# Patient Record
Sex: MALE | Race: WHITE | NOT HISPANIC OR LATINO | Employment: OTHER | URBAN - METROPOLITAN AREA
[De-identification: names, ages, dates, MRNs, and addresses within clinical notes are randomized per-mention and may not be internally consistent; named-entity substitution may affect disease eponyms.]

---

## 2022-10-28 ENCOUNTER — HOSPITAL ENCOUNTER (OUTPATIENT)
Dept: PET IMAGING | Facility: CLINIC | Age: 57
Discharge: HOME | End: 2022-10-28
Attending: PHYSICIAN ASSISTANT | Admitting: PHYSICIAN ASSISTANT
Payer: COMMERCIAL

## 2022-10-28 DIAGNOSIS — R91.8 OTHER NONSPECIFIC ABNORMAL FINDING OF LUNG FIELD: ICD-10-CM

## 2022-10-28 PROCEDURE — 343N000001 HC RX 343

## 2022-10-28 PROCEDURE — 78815 PET IMAGE W/CT SKULL-THIGH: CPT | Mod: 26

## 2022-10-28 PROCEDURE — 78815 PET IMAGE W/CT SKULL-THIGH: CPT | Mod: PI

## 2022-10-28 PROCEDURE — A9552 F18 FDG: HCPCS

## 2022-10-28 RX ADMIN — FLUDEOXYGLUCOSE F-18 11.85 MCI.: 500 INJECTION, SOLUTION INTRAVENOUS at 12:46

## 2024-08-20 ENCOUNTER — TRANSFERRED RECORDS (OUTPATIENT)
Dept: HEALTH INFORMATION MANAGEMENT | Facility: CLINIC | Age: 59
End: 2024-08-20

## 2024-09-06 ENCOUNTER — TRANSCRIBE ORDERS (OUTPATIENT)
Dept: OTHER | Age: 59
End: 2024-09-06

## 2024-09-06 DIAGNOSIS — M54.2 CERVICALGIA: Primary | ICD-10-CM

## 2024-09-09 NOTE — TELEPHONE ENCOUNTER
Action 9/9/24 MV 12.22pm   Action Taken [facility] imaging report sent to scanning       SPINE PATIENTS - NEW PROTOCOL PREVISIT    RECORDS RECEIVED FROM: external   REASON FOR VISIT: Spine, Cervicalgia    PROVIDER: Dr. Mancuso   DATE OF APPT: Spine, Cervicalgia    NOTES (FOR ALL VISITS) STATUS DETAILS   OFFICE NOTE from referring provider Received Franko ELAINE @ [facility]:  6/18/24   MEDICATION LIST Received    IMAGING  (FOR ALL VISITS)     MRI (HEAD, NECK, SPINE) PACS [facility]:  MRI Cervical Spine 8/20/24   XRAY (SPINE) *NEUROSURGERY* PACS [facility]:  XR Cervical Spine 6/18/24

## 2024-09-10 ENCOUNTER — TELEPHONE (OUTPATIENT)
Dept: NEUROSURGERY | Facility: CLINIC | Age: 59
End: 2024-09-10

## 2024-09-10 ENCOUNTER — PRE VISIT (OUTPATIENT)
Dept: NEUROSURGERY | Facility: CLINIC | Age: 59
End: 2024-09-10

## 2024-09-10 ENCOUNTER — OFFICE VISIT (OUTPATIENT)
Dept: NEUROSURGERY | Facility: CLINIC | Age: 59
End: 2024-09-10
Payer: COMMERCIAL

## 2024-09-10 VITALS — OXYGEN SATURATION: 88 % | HEART RATE: 112 BPM | SYSTOLIC BLOOD PRESSURE: 113 MMHG | DIASTOLIC BLOOD PRESSURE: 73 MMHG

## 2024-09-10 DIAGNOSIS — G89.29 CHRONIC LOW BACK PAIN, UNSPECIFIED BACK PAIN LATERALITY, UNSPECIFIED WHETHER SCIATICA PRESENT: Primary | ICD-10-CM

## 2024-09-10 DIAGNOSIS — M54.50 CHRONIC LOW BACK PAIN, UNSPECIFIED BACK PAIN LATERALITY, UNSPECIFIED WHETHER SCIATICA PRESENT: Primary | ICD-10-CM

## 2024-09-10 PROCEDURE — 99203 OFFICE O/P NEW LOW 30 MIN: CPT | Mod: GC | Performed by: NEUROLOGICAL SURGERY

## 2024-09-10 ASSESSMENT — PAIN SCALES - GENERAL: PAINLEVEL: EXTREME PAIN (8)

## 2024-09-10 NOTE — LETTER
"9/10/2024       RE: Heber Simon  [address]  [place] [address]     Dear Colleague,    Thank you for referring your patient, Heber Simon, to the [facility] [place] at [facility]. Please see a copy of my visit note below.    9/10/2024  Neurosurgery Clinic Visit    Chief Complaint: Cervicalgia    History of present illness:  Heber Simon is a 59 year old male presenting with a PMHx of tobacco abuse and chronic obstructive pulmonary disease, presenting today on referral from his primary provider for worsening neck pain.   He endorses long standing \"cervical arthritis\" with no previous intervention, however now has worsening of his symptoms. At baseline, rated 7/10, with occasional bursts of radiating pain to the posterior shoulder, anterior shoulder and upper chest, increasing the pain severity to 10/10.    Denies upper extremity radiculopathy, numbness, or gait instability, but endorses ongoing clumsiness in the hands. His pain was previously controlled on naproxen, but patient had to quit taking this due to GI concerns. Physical therapy and chiropraction ongoing, reportedly helps with the pain. Patient has not trialed HUANG's. No previous spine surgeries documented, patient is not currently on anti-platelet or anti-coagulant medication, however, he is an active smoker, and reports that it helps distract him from the pain.     Physical exam:   /73 (BP Location: Right arm, Patient Position: Sitting, Cuff Size: Adult Regular)   Pulse 112   SpO2 (!) 88%   General: Awake and alert and in no acute distress. Generalized tremors noted, uppers > lowers  Pulm: Breathing comfortably on room air  Neurologic:  - Face symmetric  - Pupils reactive, extraocular movements intact  - Full strength in upper and lower extremities  - Intact sensation to all extremities  - No clonus, patellar and bicep reflexes " 2+  - Intact coordination    Imaging:  All previous imaging pertinent to this encounter reviewed.    Assessment:  Heber Simon is a 59 year old male presenting with a PMHx of tobacco abuse and chronic obstructive pulmonary disease, presenting today on referral from his primary provider for worsening neck pain.   Images were extensively reviewed with Dr Mancuso, with no significant central canal or neural foraminal stenosis. The pattern of his pain mostly favors a musculoskeletal source.    Plan:  - No neurosurgical intervention indicated at this time  - PM&R referral for pain management    Patient seen and discussed with Dr. Jamee MD.  Approximately 40 minutes were spent in chart and image review, evaluation of the patient and assessment of next steps.    Lizeth Sandhu MD, PGY-1  Department of Neurosurgery  Pager: [phone]    Please contact neurosurgery resident on call with questions.    Dial * * [phone], enter [phone] when prompted.       REVIEWED ASSOCIATED CLINICAL DATA AND HISTORY FOUND IN THE MEDICAL RECORD:  Past Medical History:   No past medical history on file.    Surgical History:   No past surgical history on file.    Social history:        Family history:   No family history on file.    Medications:  No current outpatient medications on file.     No current facility-administered medications for this visit.       Allergies:   No Known Allergies      Again, thank you for allowing me to participate in the care of your patient.      Sincerely,    Marlen Mancuso MD

## 2024-09-10 NOTE — PATIENT INSTRUCTIONS
Dr Mancuso has recommended non-operative, conservative management for your neck and shoulder discomfort.  To that end she entered a referral to Physical Medicine for continued assessment and intervention.  A member of our scheduling team will reach out to help coordinate the initial appts.

## 2024-09-10 NOTE — PROGRESS NOTES
"9/10/2024  Neurosurgery Clinic Visit    Chief Complaint: Cervicalgia    History of present illness:  Heber Simon is a 59 year old male presenting with a PMHx of tobacco abuse and chronic obstructive pulmonary disease, presenting today on referral from his primary provider for worsening neck pain.   He endorses long standing \"cervical arthritis\" with no previous intervention, however now has worsening of his symptoms. At baseline, rated 7/10, with occasional bursts of radiating pain to the posterior shoulder, anterior shoulder and upper chest, increasing the pain severity to 10/10.    Denies upper extremity radiculopathy, numbness, or gait instability, but endorses ongoing clumsiness in the hands. His pain was previously controlled on naproxen, but patient had to quit taking this due to GI concerns. Physical therapy and chiropraction ongoing, reportedly helps with the pain. Patient has not trialed HUANG's. No previous spine surgeries documented, patient is not currently on anti-platelet or anti-coagulant medication, however, he is an active smoker, and reports that it helps distract him from the pain.     Physical exam:   /73 (BP Location: Right arm, Patient Position: Sitting, Cuff Size: Adult Regular)   Pulse 112   SpO2 (!) 88%   General: Awake and alert and in no acute distress. Generalized tremors noted, uppers > lowers  Pulm: Breathing comfortably on room air  Neurologic:  - Face symmetric  - Pupils reactive, extraocular movements intact  - Full strength in upper and lower extremities  - Intact sensation to all extremities  - No clonus, patellar and bicep reflexes 2+  - Intact coordination    Imaging:  All previous imaging pertinent to this encounter reviewed.    Assessment:  Heber Simon is a 59 year old male presenting with a PMHx of tobacco abuse and chronic obstructive pulmonary disease, presenting today on referral from his primary provider for worsening neck pain.   Images were " extensively reviewed with Dr Mancuso, with no significant central canal or neural foraminal stenosis. The pattern of his pain mostly favors a musculoskeletal source.    Plan:  - No neurosurgical intervention indicated at this time  - PM&R referral for pain management    Patient seen and discussed with Dr. Jamee MD.  Approximately 40 minutes were spent in chart and image review, evaluation of the patient and assessment of next steps.    Lizeth Sandhu MD, PGY-1  Department of Neurosurgery  Pager: [phone]    Please contact neurosurgery resident on call with questions.    Dial * * [phone], enter [phone] when prompted.       REVIEWED ASSOCIATED CLINICAL DATA AND HISTORY FOUND IN THE MEDICAL RECORD:  Past Medical History:   No past medical history on file.    Surgical History:   No past surgical history on file.    Social history:        Family history:   No family history on file.    Medications:  No current outpatient medications on file.     No current facility-administered medications for this visit.       Allergies:   No Known Allergies  I have seen this patient with the resident and formulated a plan and agree with this note.  AMP

## 2024-09-10 NOTE — TELEPHONE ENCOUNTER
Tried calling patient to reschedule appointment from Dr. Mancuso to Cassidy Moore per Yayo Block via task. Phone would ring once or twice and then hang up. Unable to reach. -KB

## 2024-09-19 ENCOUNTER — PATIENT OUTREACH (OUTPATIENT)
Dept: CARE COORDINATION | Facility: CLINIC | Age: 59
End: 2024-09-19

## 2024-09-25 ENCOUNTER — TELEPHONE (OUTPATIENT)
Dept: PHYSICAL MEDICINE AND REHAB | Facility: CLINIC | Age: 59
End: 2024-09-25

## 2024-09-25 ENCOUNTER — OFFICE VISIT (OUTPATIENT)
Dept: PHYSICAL MEDICINE AND REHAB | Facility: CLINIC | Age: 59
End: 2024-09-25
Payer: COMMERCIAL

## 2024-09-25 VITALS — OXYGEN SATURATION: 97 % | SYSTOLIC BLOOD PRESSURE: 107 MMHG | HEART RATE: 111 BPM | DIASTOLIC BLOOD PRESSURE: 68 MMHG

## 2024-09-25 DIAGNOSIS — M75.52 SUBACROMIAL BURSITIS OF LEFT SHOULDER JOINT: ICD-10-CM

## 2024-09-25 DIAGNOSIS — G89.29 CHRONIC LOW BACK PAIN, UNSPECIFIED BACK PAIN LATERALITY, UNSPECIFIED WHETHER SCIATICA PRESENT: ICD-10-CM

## 2024-09-25 DIAGNOSIS — M47.812 CERVICAL SPONDYLOSIS WITHOUT MYELOPATHY: Primary | ICD-10-CM

## 2024-09-25 DIAGNOSIS — G89.29 OTHER CHRONIC PAIN: ICD-10-CM

## 2024-09-25 DIAGNOSIS — M75.42 IMPINGEMENT SYNDROME, SHOULDER, LEFT: ICD-10-CM

## 2024-09-25 DIAGNOSIS — M54.50 CHRONIC LOW BACK PAIN, UNSPECIFIED BACK PAIN LATERALITY, UNSPECIFIED WHETHER SCIATICA PRESENT: ICD-10-CM

## 2024-09-25 PROCEDURE — 99205 OFFICE O/P NEW HI 60 MIN: CPT | Mod: 25 | Performed by: PHYSICAL MEDICINE & REHABILITATION

## 2024-09-25 PROCEDURE — 20610 DRAIN/INJ JOINT/BURSA W/O US: CPT | Mod: LT | Performed by: PHYSICAL MEDICINE & REHABILITATION

## 2024-09-25 RX ORDER — FAMOTIDINE 20 MG/1
20 TABLET, FILM COATED ORAL
COMMUNITY
Start: 2021-11-02

## 2024-09-25 RX ORDER — CLONAZEPAM 0.5 MG/1
0.5 TABLET ORAL
COMMUNITY

## 2024-09-25 RX ORDER — BUPIVACAINE HYDROCHLORIDE 5 MG/ML
4 INJECTION, SOLUTION EPIDURAL; INTRACAUDAL ONCE
Status: COMPLETED | OUTPATIENT
Start: 2024-09-25 | End: 2024-09-25

## 2024-09-25 RX ORDER — ALBUTEROL SULFATE 90 UG/1
AEROSOL, METERED RESPIRATORY (INHALATION)
COMMUNITY
Start: 2021-12-14

## 2024-09-25 RX ORDER — CLOZAPINE 100 MG/1
200 TABLET ORAL
COMMUNITY

## 2024-09-25 RX ORDER — ACETAMINOPHEN 500 MG
1000 TABLET ORAL
COMMUNITY

## 2024-09-25 RX ORDER — DIVALPROEX SODIUM 250 MG/1
250 TABLET, EXTENDED RELEASE ORAL
COMMUNITY
Start: 2022-02-10

## 2024-09-25 RX ORDER — BUDESONIDE AND FORMOTEROL FUMARATE DIHYDRATE 160; 4.5 UG/1; UG/1
2 AEROSOL RESPIRATORY (INHALATION)
COMMUNITY

## 2024-09-25 RX ORDER — VENLAFAXINE HYDROCHLORIDE 75 MG/1
225 CAPSULE, EXTENDED RELEASE ORAL
COMMUNITY
Start: 2022-06-10

## 2024-09-25 RX ORDER — BETAMETHASONE SODIUM PHOSPHATE AND BETAMETHASONE ACETATE 3; 3 MG/ML; MG/ML
6 INJECTION, SUSPENSION INTRA-ARTICULAR; INTRALESIONAL; INTRAMUSCULAR; SOFT TISSUE ONCE
Status: COMPLETED | OUTPATIENT
Start: 2024-09-25 | End: 2024-09-25

## 2024-09-25 RX ADMIN — BETAMETHASONE SODIUM PHOSPHATE AND BETAMETHASONE ACETATE 6 MG: 3; 3 INJECTION, SUSPENSION INTRA-ARTICULAR; INTRALESIONAL; INTRAMUSCULAR; SOFT TISSUE at 13:39

## 2024-09-25 RX ADMIN — BUPIVACAINE HYDROCHLORIDE 20 MG: 5 INJECTION, SOLUTION EPIDURAL; INTRACAUDAL at 13:40

## 2024-09-25 NOTE — TELEPHONE ENCOUNTER
Called patient to schedule procedure with Dr. Alberts    Date of Procedure: 12/2/24 & 12/16/24    Arrival time given: Yes: Arrival Time 12pm       Procedure Location: [facility]     Verified Location with Patient:  Yes  Address provided to the patient     Pre-op H&P Required:  No: Local anesthesia        Post-Op/Follow Up Appt:  Not Indicated in Request      Informed patient they will need a  to drive them home:  Yes    Patients : Spouse     Patient is aware that pre-op RN from the procedure center will call 2-3 days prior to scheduled procedure to confirm arrival time and review any instructions:  Yes       Additional Comments: N/A        Sruthi Dominguez MA on 9/25/2024 at 1:59 PM      P: [phone]*   
DISPLAY PLAN FREE TEXT

## 2024-09-25 NOTE — NURSING NOTE
Heber Simon is a 59 year old male who presents for:  Chief Complaint   Patient presents with    Consult     Neck and shoulder pain       Initial Vitals: /68 (BP Location: Right arm, Patient Position: Supine, Cuff Size: Adult Regular)   Pulse 111   SpO2 97%   BP cuff size: regular      Leda Pelayo MA

## 2024-09-25 NOTE — PROGRESS NOTES
CC: I am seeing this patient at the request of Dr. Mancuso for evaluation of chronic left neck shoulder and chest pain.    Patient is a very pleasant 59-year-old disabled  with a history of COPD.  He has been dealing with chronic neck pain for many years but has been worse since March of this year.  He rates his pain at its worst at 10 at best a 7 and currently and 9.  Pain would radiate into the chest from the back and notes that it feels searing.  It has also affected his left hand functioning with clumsiness.  He has tried naproxen but had problems with GI and had to stop.  He has completed physical therapy and chiropractic without help.  He has no history of prior surgeries.  He had initially quit smoking but has resumed due to the pain.  He was recently seen by the neurosurgeon.  He had undergone MRI of the cervical spine on 8/2/2024.  This was at [facility].  No neurosurgical intervention was felt indicated.  Dr. Mancuso did not see significant central canal or neuroforaminal stenosis.    PMH:  1. Acute hypoxic respiratory failure secondary to acute COPD exacerbation  2. Anxiety disorder  3. Hyperlipidemia  4. Chronic constipation  5. Hyponatremia   6. schizoaffective disorder  7. Bipolar type 1     No past surgical history on file.    There is family history of back pain in his brother.    Social History     Socioeconomic History    Marital status: Single     Spouse name: Not on file    Number of children: Not on file    Years of education: Not on file    Highest education level: Not on file   Occupational History    Not on file   Tobacco Use    Smoking status: Every Day     Types: Cigarettes    Smokeless tobacco: Never   Vaping Use    Vaping status: Never Used   Substance and Sexual Activity    Alcohol use: Not on file    Drug use: Not on file    Sexual activity: Not on file   Other Topics Concern    Not on file   Social History Narrative    Not on file     Social Determinants of Health     Financial Resource  Strain: Not on file   Food Insecurity: Not on file   Transportation Needs: Not on file   Physical Activity: Not on file   Stress: Not on file   Social Connections: Not on file   Interpersonal Safety: Not on file   Housing Stability: Not on file      Current Outpatient Medications   Medication Sig Dispense Refill    albuterol (PROAIR HFA/PROVENTIL HFA/VENTOLIN HFA) 108 (90 Base) MCG/ACT inhaler INHALE 2 PUFFS BY MOUTH EVERY 4 HOURS AS NEEDED FOR BREATHING , WAIT AT LEAST 1-5 MINUTES BETWEEN EACH PUFF      divalproex sodium extended-release (DEPAKOTE ER) 250 MG 24 hr tablet 250 mg.      famotidine (PEPCID) 20 MG tablet 20 mg.      venlafaxine (EFFEXOR XR) 75 MG 24 hr capsule 225 mg.      acetaminophen (TYLENOL) 500 MG tablet Take 1,000 mg by mouth.      budesonide-formoterol (SYMBICORT) 160-4.5 MCG/ACT Inhaler Inhale 2 Inhalations into the lungs.      clonazePAM (KLONOPIN) 0.5 MG tablet Take 0.5 mg by mouth.      cloZAPine (CLOZARIL) 100 MG tablet Take 200 mg by mouth.       /68 (BP Location: Right arm, Patient Position: Supine, Cuff Size: Adult Regular)   Pulse 111   SpO2 97%      On examination, patient is alert and cooperative.  He has COPD.    Tremor noted uppers greater than lowers.    HEENT is otherwise unremarkable.  Extraocular movements are intact.  Face is symmetric.  Tongue is midline.  Neck is supple.  He is able to move his upper extremities.  He has discomfort with the left shoulder region.  He is tender over the left sternal clavicular joint as well as subacromial bursal region on the left.  There is impingement of the shoulder with tenderness over the coracoid process.  There is tenderness of the cervical facets especially left C4-5 and C5-6.  The right side was not tender.    He has protuberant abdomen.  No tenderness noted over the lumbar spine.  SI was nontender.    He is able to stand.  Romberg is negative.  Gait is unremarkable.  Neurological examination revealed normal strength, normal  sensation.  Reflexes were symmetric and plantars were downgoing.  Coordination tests are intact.    Impression: At this point this 59-year-old gentleman with cervical facet arthropathy at C4-5 and C5-6 resulting in left-sided neck and shoulder pain.  There is also some chest wall pain associated.  He also has involvement of the left shoulder with impingement including subacromial bursal and over the coracoid process.  There could be a sternoclavicular joint involvement as well.    In terms of treatment, he has tried conservative measures including medications and therapies without success.  He has had imaging studies.  I would recommend injecting the left subacromial bursa, origin of pectoralis minor and coracobrachialis which are noted to be tender and see how he responds.  He will require medial branch blocks at C4-C6 on the left side, diagnostic followed by confirmatory 2 weeks apart.  If he gets good relief from them, he would be candidate for radiofrequency ablations.  He is interested in quitting smoking and managing his pain will help with that.    This was reviewed at length with the patient.  Patient would like to proceed with the treatment.  60 minutes visit, greater than 50% was for counseling and coordination of above-mentioned issues, exclusive of the procedure time.    PROCEDURE NOTE: With his informed consent, after explaining the benefits and risks of the procedure, using ChloraPrep for skin prep, 1 cc of Celestone with 4 cc of 0.5% bupivacaine, 4 cc were injected into the left subacromial region.  Next half a cc was injected into the left pectoralis minor origin and half a cc to left coracobrachialis origin.  Post injections, he was able to feel comfortable and move his shoulder better with improvement.    Will seek authorization for his facet blocks and eventual radiofrequency ablations.  Thank you are much for allow me to assist in his care.  All questions were answered to his and his  sister-in-law's satisfaction.    Flavio Alberts MD

## 2024-09-25 NOTE — LETTER
9/25/2024       RE: Heber Simon  [address]  [place] [address]     Dear Colleague,    Thank you for referring your patient, Heber Simon, to the [facility] at [facility]. Please see a copy of my visit note below.    CC: I am seeing this patient at the request of Dr. Mancuso for evaluation of chronic left neck shoulder and chest pain.    Patient is a very pleasant 59-year-old disabled  with a history of COPD.  He has been dealing with chronic neck pain for many years but has been worse since March of this year.  He rates his pain at its worst at 10 at best a 7 and currently and 9.  Pain would radiate into the chest from the back and notes that it feels searing.  It has also affected his left hand functioning with clumsiness.  He has tried naproxen but had problems with GI and had to stop.  He has completed physical therapy and chiropractic without help.  He has no history of prior surgeries.  He had initially quit smoking but has resumed due to the pain.  He was recently seen by the neurosurgeon.  He had undergone MRI of the cervical spine on 8/2/2024.  This was at [facility].  No neurosurgical intervention was felt indicated.  Dr. Mancuso did not see significant central canal or neuroforaminal stenosis.    PMH:  1. Acute hypoxic respiratory failure secondary to acute COPD exacerbation  2. Anxiety disorder  3. Hyperlipidemia  4. Chronic constipation  5. Hyponatremia   6. schizoaffective disorder  7. Bipolar type 1     No past surgical history on file.    There is family history of back pain in his brother.    Social History     Socioeconomic History     Marital status: Single     Spouse name: Not on file     Number of children: Not on file     Years of education: Not on file     Highest education level: Not on file   Occupational History     Not on file   Tobacco Use     Smoking status: Every Day     Types: Cigarettes      Smokeless tobacco: Never   Vaping Use     Vaping status: Never Used   Substance and Sexual Activity     Alcohol use: Not on file     Drug use: Not on file     Sexual activity: Not on file   Other Topics Concern     Not on file   Social History Narrative     Not on file     Social Determinants of Health     Financial Resource Strain: Not on file   Food Insecurity: Not on file   Transportation Needs: Not on file   Physical Activity: Not on file   Stress: Not on file   Social Connections: Not on file   Interpersonal Safety: Not on file   Housing Stability: Not on file      Current Outpatient Medications   Medication Sig Dispense Refill     albuterol (PROAIR HFA/PROVENTIL HFA/VENTOLIN HFA) 108 (90 Base) MCG/ACT inhaler INHALE 2 PUFFS BY MOUTH EVERY 4 HOURS AS NEEDED FOR BREATHING , WAIT AT LEAST 1-5 MINUTES BETWEEN EACH PUFF       divalproex sodium extended-release (DEPAKOTE ER) 250 MG 24 hr tablet 250 mg.       famotidine (PEPCID) 20 MG tablet 20 mg.       venlafaxine (EFFEXOR XR) 75 MG 24 hr capsule 225 mg.       acetaminophen (TYLENOL) 500 MG tablet Take 1,000 mg by mouth.       budesonide-formoterol (SYMBICORT) 160-4.5 MCG/ACT Inhaler Inhale 2 Inhalations into the lungs.       clonazePAM (KLONOPIN) 0.5 MG tablet Take 0.5 mg by mouth.       cloZAPine (CLOZARIL) 100 MG tablet Take 200 mg by mouth.       /68 (BP Location: Right arm, Patient Position: Supine, Cuff Size: Adult Regular)   Pulse 111   SpO2 97%      On examination, patient is alert and cooperative.  He has COPD.    Tremor noted uppers greater than lowers.    HEENT is otherwise unremarkable.  Extraocular movements are intact.  Face is symmetric.  Tongue is midline.  Neck is supple.  He is able to move his upper extremities.  He has discomfort with the left shoulder region.  He is tender over the left sternal clavicular joint as well as subacromial bursal region on the left.  There is impingement of the shoulder with tenderness over the coracoid  process.  There is tenderness of the cervical facets especially left C4-5 and C5-6.  The right side was not tender.    He has protuberant abdomen.  No tenderness noted over the lumbar spine.  SI was nontender.    He is able to stand.  Romberg is negative.  Gait is unremarkable.  Neurological examination revealed normal strength, normal sensation.  Reflexes were symmetric and plantars were downgoing.  Coordination tests are intact.    Impression: At this point this 59-year-old gentleman with cervical facet arthropathy at C4-5 and C5-6 resulting in left-sided neck and shoulder pain.  There is also some chest wall pain associated.  He also has involvement of the left shoulder with impingement including subacromial bursal and over the coracoid process.  There could be a sternoclavicular joint involvement as well.    In terms of treatment, he has tried conservative measures including medications and therapies without success.  He has had imaging studies.  I would recommend injecting the left subacromial bursa, origin of pectoralis minor and coracobrachialis which are noted to be tender and see how he responds.  He will require medial branch blocks at C4-C6 on the left side, diagnostic followed by confirmatory 2 weeks apart.  If he gets good relief from them, he would be candidate for radiofrequency ablations.  He is interested in quitting smoking and managing his pain will help with that.    This was reviewed at length with the patient.  Patient would like to proceed with the treatment.  60 minutes visit, greater than 50% was for counseling and coordination of above-mentioned issues, exclusive of the procedure time.    PROCEDURE NOTE: With his informed consent, after explaining the benefits and risks of the procedure, using ChloraPrep for skin prep, 1 cc of Celestone with 4 cc of 0.5% bupivacaine, 4 cc were injected into the left subacromial region.  Next half a cc was injected into the left pectoralis minor origin and  half a cc to left coracobrachialis origin.  Post injections, he was able to feel comfortable and move his shoulder better with improvement.    Will seek authorization for his facet blocks and eventual radiofrequency ablations.  Thank you are much for allow me to assist in his care.  All questions were answered to his and his sister-in-law's satisfaction.    Flavio Alberts MD       Again, thank you for allowing me to participate in the care of your patient.      Sincerely,    Flavio Alberts MD

## 2024-10-06 ENCOUNTER — HEALTH MAINTENANCE LETTER (OUTPATIENT)
Age: 59
End: 2024-10-06

## 2024-10-09 ENCOUNTER — OFFICE VISIT (OUTPATIENT)
Dept: PHYSICAL MEDICINE AND REHAB | Facility: CLINIC | Age: 59
End: 2024-10-09
Payer: COMMERCIAL

## 2024-10-09 VITALS — OXYGEN SATURATION: 93 % | SYSTOLIC BLOOD PRESSURE: 128 MMHG | DIASTOLIC BLOOD PRESSURE: 85 MMHG | HEART RATE: 100 BPM

## 2024-10-09 DIAGNOSIS — G24.3 CERVICAL DYSTONIA: ICD-10-CM

## 2024-10-09 DIAGNOSIS — M47.812 CERVICAL SPONDYLOSIS WITHOUT MYELOPATHY: ICD-10-CM

## 2024-10-09 DIAGNOSIS — G24.8 FOCAL DYSTONIA: Primary | ICD-10-CM

## 2024-10-09 DIAGNOSIS — G89.29 OTHER CHRONIC PAIN: ICD-10-CM

## 2024-10-09 PROCEDURE — 99214 OFFICE O/P EST MOD 30 MIN: CPT | Mod: 25 | Performed by: PHYSICAL MEDICINE & REHABILITATION

## 2024-10-09 PROCEDURE — 20553 NJX 1/MLT TRIGGER POINTS 3/>: CPT | Performed by: PHYSICAL MEDICINE & REHABILITATION

## 2024-10-09 RX ORDER — BETAMETHASONE SODIUM PHOSPHATE AND BETAMETHASONE ACETATE 3; 3 MG/ML; MG/ML
6 INJECTION, SUSPENSION INTRA-ARTICULAR; INTRALESIONAL; INTRAMUSCULAR; SOFT TISSUE ONCE
Status: COMPLETED | OUTPATIENT
Start: 2024-10-09 | End: 2024-10-09

## 2024-10-09 RX ORDER — BUPIVACAINE HYDROCHLORIDE 5 MG/ML
5 INJECTION, SOLUTION EPIDURAL; INTRACAUDAL ONCE
Status: COMPLETED | OUTPATIENT
Start: 2024-10-09 | End: 2024-10-09

## 2024-10-09 RX ADMIN — BUPIVACAINE HYDROCHLORIDE 25 MG: 5 INJECTION, SOLUTION EPIDURAL; INTRACAUDAL at 12:07

## 2024-10-09 RX ADMIN — BETAMETHASONE SODIUM PHOSPHATE AND BETAMETHASONE ACETATE 6 MG: 3; 3 INJECTION, SUSPENSION INTRA-ARTICULAR; INTRALESIONAL; INTRAMUSCULAR; SOFT TISSUE at 12:06

## 2024-10-09 ASSESSMENT — PAIN SCALES - GENERAL: PAINLEVEL: EXTREME PAIN (8)

## 2024-10-09 NOTE — LETTER
10/9/2024       RE: Heber Simon  [address]  [place] [address]     Dear Colleague,    Thank you for referring your patient, Heber Simon, to the [employer] [facility] at [facility]. Please see a copy of my visit note below.    CC: Patient with chronic left neck shoulder and chest pain.     He was last seen by me on 9/25/2024.  His interval history is notable for getting 2 days worth of good relief with the pain.  However he ended up getting massage therapy and his pain shot up to 8-9 out of 10.    He also has a traction unit that he has used in the past.  I had cautioned him from using it due to the significant narrowing and impingement that he has in the cervical spine.    Patient is a very pleasant 59-year-old disabled  with a history of COPD.  He has been dealing with chronic neck pain for many years but has been worse since March of this year.  He rates his pain at its worst at 10 at best a 7 and currently and 9.  Pain would radiate into the chest from the back and notes that it feels searing.  It has also affected his left hand functioning with clumsiness.  He has tried naproxen but had problems with GI and had to stop.  He has completed physical therapy and chiropractic without help.  He has no history of prior surgeries.  He had initially quit smoking but has resumed due to the pain.  He was recently seen by the neurosurgeon.  He had undergone MRI of the cervical spine on 8/2/2024.  This was at [facility].  No neurosurgical intervention was felt indicated.  Dr. Mancuso did not see significant central canal or neuroforaminal stenosis.     PMH:  1. Acute hypoxic respiratory failure secondary to acute COPD exacerbation  2. Anxiety disorder  3. Hyperlipidemia  4. Chronic constipation  5. Hyponatremia   6. schizoaffective disorder  7. Bipolar type 1      Past Surgical History   No past surgical history on file.        There  is family history of back pain in his brother.     Social History   Social History            Socioeconomic History     Marital status: Single       Spouse name: Not on file     Number of children: Not on file     Years of education: Not on file     Highest education level: Not on file   Occupational History     Not on file   Tobacco Use     Smoking status: Every Day       Types: Cigarettes     Smokeless tobacco: Never   Vaping Use     Vaping status: Never Used   Substance and Sexual Activity     Alcohol use: Not on file     Drug use: Not on file     Sexual activity: Not on file   Other Topics Concern     Not on file   Social History Narrative     Not on file      Social Determinants of Health      Financial Resource Strain: Not on file   Food Insecurity: Not on file   Transportation Needs: Not on file   Physical Activity: Not on file   Stress: Not on file   Social Connections: Not on file   Interpersonal Safety: Not on file   Housing Stability: Not on file         Current Outpatient Medications   Medication Sig Dispense Refill     acetaminophen (TYLENOL) 500 MG tablet Take 1,000 mg by mouth.       albuterol (PROAIR HFA/PROVENTIL HFA/VENTOLIN HFA) 108 (90 Base) MCG/ACT inhaler INHALE 2 PUFFS BY MOUTH EVERY 4 HOURS AS NEEDED FOR BREATHING , WAIT AT LEAST 1-5 MINUTES BETWEEN EACH PUFF       budesonide-formoterol (SYMBICORT) 160-4.5 MCG/ACT Inhaler Inhale 2 Inhalations into the lungs.       clonazePAM (KLONOPIN) 0.5 MG tablet Take 0.5 mg by mouth.       cloZAPine (CLOZARIL) 100 MG tablet Take 200 mg by mouth.       divalproex sodium extended-release (DEPAKOTE ER) 250 MG 24 hr tablet 250 mg.       famotidine (PEPCID) 20 MG tablet 20 mg.       venlafaxine (EFFEXOR XR) 75 MG 24 hr capsule 225 mg.         /85 (BP Location: Left arm, Patient Position: Supine, Cuff Size: Adult Regular)   Pulse 100   SpO2 93%     On examination, patient is alert and cooperative.  He has COPD.     Tremor noted uppers greater than  lowers.     HEENT is otherwise unremarkable.  Extraocular movements are intact.  Face is symmetric.  Tongue is midline.  Neck is supple.  He is able to move his upper extremities.  He has discomfort with the left shoulder region.  There is not much tenderness in the subacromial space, coracoid process or the AC joint.  There is tenderness of the cervical facets especially left C4-5 and C5-6.  The right side was not tender.  There is tenderness over the left trapezius, levator scapulae and the neck.  There is involvement of the rhomboids minor rhomboids major infraspinatus, and pectoralis major in the chest.     He has protuberant abdomen.  No tenderness noted over the lumbar spine.  SI was nontender.     He is able to stand.  Romberg is negative.  Gait is unremarkable.  Neurological examination revealed normal strength, normal sensation.  Reflexes were symmetric and plantars were downgoing.  Coordination tests are intact.     Impression: At this point this 59-year-old gentleman with cervical facet arthropathy at C4-5 and C5-6 resulting in left-sided neck and shoulder pain.  There is also some chest wall pain associated.  He has a number of muscles affected in the neck and shoulder region and chest wall region in a focal dystonia pattern.  I will repeat trigger point injections.  I recommended to be careful with massages and tractions and avoid them when possible.  I also encouraged him to use some ice on a as needed basis.    I will also order neurotoxin therapy to treat the affected muscles as that will give him longer periods of relief while avoiding lidocaine Marcaine and steroids.  He is interested in this.  Will treat his cervical facets once the schedule permits.     This was reviewed at length with the patient.  Patient would like to proceed with the treatment.  30 minutes visit, greater than 50% was for counseling and coordination of above-mentioned issues, exclusive of the procedure time.     PROCEDURE  NOTE: With his informed consent, after explaining the benefits and risks of the procedure, using ChloraPrep for skin prep, 1 cc of Celestone with 4 cc of 0.5% bupivacaine, triggers in the left levator scapulae in the neck, trapezius, rhomboids minor, rhomboids major, infraspinatus were injected with relief.  Triggers in the left pectoralis major were injected with relief.  Post injection,  he was able to feel comfortable and move his shoulder better with improvement.     All questions were answered to his and his sister-in-law's satisfaction.     Flavio Alberts MD       Again, thank you for allowing me to participate in the care of your patient.      Sincerely,    Flavio Alberts MD

## 2024-10-09 NOTE — PROGRESS NOTES
CC: Patient with chronic left neck shoulder and chest pain.     He was last seen by me on 9/25/2024.  His interval history is notable for getting 2 days worth of good relief with the pain.  However he ended up getting massage therapy and his pain shot up to 8-9 out of 10.    He also has a traction unit that he has used in the past.  I had cautioned him from using it due to the significant narrowing and impingement that he has in the cervical spine.    Patient is a very pleasant 59-year-old disabled  with a history of COPD.  He has been dealing with chronic neck pain for many years but has been worse since March of this year.  He rates his pain at its worst at 10 at best a 7 and currently and 9.  Pain would radiate into the chest from the back and notes that it feels searing.  It has also affected his left hand functioning with clumsiness.  He has tried naproxen but had problems with GI and had to stop.  He has completed physical therapy and chiropractic without help.  He has no history of prior surgeries.  He had initially quit smoking but has resumed due to the pain.  He was recently seen by the neurosurgeon.  He had undergone MRI of the cervical spine on 8/2/2024.  This was at [facility].  No neurosurgical intervention was felt indicated.  Dr. Mancuso did not see significant central canal or neuroforaminal stenosis.     PMH:  1. Acute hypoxic respiratory failure secondary to acute COPD exacerbation  2. Anxiety disorder  3. Hyperlipidemia  4. Chronic constipation  5. Hyponatremia   6. schizoaffective disorder  7. Bipolar type 1      Past Surgical History   No past surgical history on file.        There is family history of back pain in his brother.     Social History   Social History            Socioeconomic History    Marital status: Single       Spouse name: Not on file    Number of children: Not on file    Years of education: Not on file    Highest education level: Not on file   Occupational History    Not on  file   Tobacco Use    Smoking status: Every Day       Types: Cigarettes    Smokeless tobacco: Never   Vaping Use    Vaping status: Never Used   Substance and Sexual Activity    Alcohol use: Not on file    Drug use: Not on file    Sexual activity: Not on file   Other Topics Concern    Not on file   Social History Narrative    Not on file      Social Determinants of Health      Financial Resource Strain: Not on file   Food Insecurity: Not on file   Transportation Needs: Not on file   Physical Activity: Not on file   Stress: Not on file   Social Connections: Not on file   Interpersonal Safety: Not on file   Housing Stability: Not on file         Current Outpatient Medications   Medication Sig Dispense Refill    acetaminophen (TYLENOL) 500 MG tablet Take 1,000 mg by mouth.      albuterol (PROAIR HFA/PROVENTIL HFA/VENTOLIN HFA) 108 (90 Base) MCG/ACT inhaler INHALE 2 PUFFS BY MOUTH EVERY 4 HOURS AS NEEDED FOR BREATHING , WAIT AT LEAST 1-5 MINUTES BETWEEN EACH PUFF      budesonide-formoterol (SYMBICORT) 160-4.5 MCG/ACT Inhaler Inhale 2 Inhalations into the lungs.      clonazePAM (KLONOPIN) 0.5 MG tablet Take 0.5 mg by mouth.      cloZAPine (CLOZARIL) 100 MG tablet Take 200 mg by mouth.      divalproex sodium extended-release (DEPAKOTE ER) 250 MG 24 hr tablet 250 mg.      famotidine (PEPCID) 20 MG tablet 20 mg.      venlafaxine (EFFEXOR XR) 75 MG 24 hr capsule 225 mg.         /85 (BP Location: Left arm, Patient Position: Supine, Cuff Size: Adult Regular)   Pulse 100   SpO2 93%     On examination, patient is alert and cooperative.  He has COPD.     Tremor noted uppers greater than lowers.     HEENT is otherwise unremarkable.  Extraocular movements are intact.  Face is symmetric.  Tongue is midline.  Neck is supple.  He is able to move his upper extremities.  He has discomfort with the left shoulder region.  There is not much tenderness in the subacromial space, coracoid process or the AC joint.  There is tenderness  of the cervical facets especially left C4-5 and C5-6.  The right side was not tender.  There is tenderness over the left trapezius, levator scapulae and the neck.  There is involvement of the rhomboids minor rhomboids major infraspinatus, and pectoralis major in the chest.     He has protuberant abdomen.  No tenderness noted over the lumbar spine.  SI was nontender.     He is able to stand.  Romberg is negative.  Gait is unremarkable.  Neurological examination revealed normal strength, normal sensation.  Reflexes were symmetric and plantars were downgoing.  Coordination tests are intact.     Impression: At this point this 59-year-old gentleman with cervical facet arthropathy at C4-5 and C5-6 resulting in left-sided neck and shoulder pain.  There is also some chest wall pain associated.  He has a number of muscles affected in the neck and shoulder region and chest wall region in a focal dystonia pattern.  I will repeat trigger point injections.  I recommended to be careful with massages and tractions and avoid them when possible.  I also encouraged him to use some ice on a as needed basis.    I will also order neurotoxin therapy to treat the affected muscles as that will give him longer periods of relief while avoiding lidocaine Marcaine and steroids.  He is interested in this.  Will treat his cervical facets once the schedule permits.     This was reviewed at length with the patient.  Patient would like to proceed with the treatment.  30 minutes visit, greater than 50% was for counseling and coordination of above-mentioned issues, exclusive of the procedure time.     PROCEDURE NOTE: With his informed consent, after explaining the benefits and risks of the procedure, using ChloraPrep for skin prep, 1 cc of Celestone with 4 cc of 0.5% bupivacaine, triggers in the left levator scapulae in the neck, trapezius, rhomboids minor, rhomboids major, infraspinatus were injected with relief.  Triggers in the left pectoralis  major were injected with relief.  Post injection,  he was able to feel comfortable and move his shoulder better with improvement.     All questions were answered to his and his sister-in-law's satisfaction.     Flavio Alberts MD

## 2024-10-09 NOTE — NURSING NOTE
Heber Simon is a 59 year old male who presents for:  Chief Complaint   Patient presents with    Follow Up     2 week follow up TPI, had relief for about 2 days then symptoms returned.  Had massage after injections wondering if that made things worse       Initial Vitals: /85 (BP Location: Left arm, Patient Position: Supine, Cuff Size: Adult Regular)   Pulse 100   SpO2 93%   BP cuff size: regular      Leda Pelayo MA

## 2024-10-22 ENCOUNTER — OFFICE VISIT (OUTPATIENT)
Dept: PHYSICAL MEDICINE AND REHAB | Facility: CLINIC | Age: 59
End: 2024-10-22
Payer: COMMERCIAL

## 2024-10-22 VITALS — OXYGEN SATURATION: 97 % | HEART RATE: 99 BPM | DIASTOLIC BLOOD PRESSURE: 70 MMHG | SYSTOLIC BLOOD PRESSURE: 104 MMHG

## 2024-10-22 DIAGNOSIS — G89.29 OTHER CHRONIC PAIN: ICD-10-CM

## 2024-10-22 DIAGNOSIS — M47.812 CERVICAL SPONDYLOSIS WITHOUT MYELOPATHY: ICD-10-CM

## 2024-10-22 DIAGNOSIS — G24.8 FOCAL DYSTONIA: Primary | ICD-10-CM

## 2024-10-22 PROCEDURE — 99214 OFFICE O/P EST MOD 30 MIN: CPT | Performed by: PHYSICAL MEDICINE & REHABILITATION

## 2024-10-22 NOTE — PROGRESS NOTES
CC: Patient with chronic left neck shoulder and chest pain.     He was last seen by me on 10/9/2024.  He received trigger point injections to multiple muscles in the neck and shoulder and chest wall on the left side.  He felt it was helpful for about 8 days and then got worse.    He is awaiting approval of neurotoxin.  He is scheduled for the medial branch blocks of the neck on the left side in December.    He was seen by me on 9/25/2024.  His interval history is notable for getting 2 days worth of good relief with the pain.  However he ended up getting massage therapy and his pain shot up to 8-9 out of 10.     He also has a traction unit that he has used in the past.  I had cautioned him from using it due to the significant narrowing and impingement that he has in the cervical spine.     Patient is a very pleasant 59-year-old disabled  with a history of COPD.  He has been dealing with chronic neck pain for many years but has been worse since March of this year.  He rates his pain at its worst at 10 at best a 7 and currently and 9.  Pain would radiate into the chest from the back and notes that it feels searing.  It has also affected his left hand functioning with clumsiness.  He has tried naproxen but had problems with GI and had to stop.  He has completed physical therapy and chiropractic without help.  He has no history of prior surgeries.  He had initially quit smoking but has resumed due to the pain.  He was recently seen by the neurosurgeon.  He had undergone MRI of the cervical spine on 8/2/2024.  This was at [facility].  This did show moderate C3-4 spinal stenosis with retrolisthesis and 3 to 4 mm paracentral disc protrusion impinging the cord.  Mild C5-6 and C6-7 spinal stenosis and severe bilateral C6-7 and left more so than right C5-6 neuroforaminal stenosis.    No neurosurgical intervention was felt indicated.  Dr. Mancuso did not see significant central canal or neuroforaminal stenosis.     PMH:  1.  Acute hypoxic respiratory failure secondary to acute COPD exacerbation  2. Anxiety disorder  3. Hyperlipidemia  4. Chronic constipation  5. Hyponatremia   6. schizoaffective disorder  7. Bipolar type 1      Past Surgical History   No past surgical history on file.         There is family history of back pain in his brother.     Social History   Social History                Socioeconomic History    Marital status: Single       Spouse name: Not on file    Number of children: Not on file    Years of education: Not on file    Highest education level: Not on file   Occupational History    Not on file   Tobacco Use    Smoking status: Every Day       Types: Cigarettes    Smokeless tobacco: Never   Vaping Use    Vaping status: Never Used   Substance and Sexual Activity    Alcohol use: Not on file    Drug use: Not on file    Sexual activity: Not on file   Other Topics Concern    Not on file   Social History Narrative    Not on file      Social Determinants of Health      Financial Resource Strain: Not on file   Food Insecurity: Not on file   Transportation Needs: Not on file   Physical Activity: Not on file   Stress: Not on file   Social Connections: Not on file   Interpersonal Safety: Not on file   Housing Stability: Not on file         Current Outpatient Medications   Medication Sig Dispense Refill    acetaminophen (TYLENOL) 500 MG tablet Take 1,000 mg by mouth.      albuterol (PROAIR HFA/PROVENTIL HFA/VENTOLIN HFA) 108 (90 Base) MCG/ACT inhaler INHALE 2 PUFFS BY MOUTH EVERY 4 HOURS AS NEEDED FOR BREATHING , WAIT AT LEAST 1-5 MINUTES BETWEEN EACH PUFF      budesonide-formoterol (SYMBICORT) 160-4.5 MCG/ACT Inhaler Inhale 2 Inhalations into the lungs.      clonazePAM (KLONOPIN) 0.5 MG tablet Take 0.5 mg by mouth.      cloZAPine (CLOZARIL) 100 MG tablet Take 200 mg by mouth.      divalproex sodium extended-release (DEPAKOTE ER) 250 MG 24 hr tablet 250 mg.      famotidine (PEPCID) 20 MG tablet 20 mg.      venlafaxine (EFFEXOR  XR) 75 MG 24 hr capsule 225 mg.            /70 (BP Location: Left arm, Patient Position: Supine, Cuff Size: Adult Regular)   Pulse 99   SpO2 97%       On examination, patient is alert and cooperative.  He has COPD.     Tremor noted uppers greater than lowers.     HEENT is otherwise unremarkable.  Extraocular movements are intact.  Face is symmetric.  Tongue is midline.  Neck is supple.  He is able to move his upper extremities.  He has discomfort with the left shoulder region.  There is not much tenderness in the subacromial space, coracoid process or the AC joint.  There is tenderness of the cervical facets especially left C4-5 and C5-6.  The right side was not tender.  There is tenderness over the left trapezius, levator scapulae and the neck.  There is involvement of the rhomboids minor rhomboids major infraspinatus, and pectoralis major in the chest.     He has protuberant abdomen.  No tenderness noted over the lumbar spine.  SI was nontender.     He is able to stand.  Romberg is negative.  Gait is unremarkable.  Neurological examination revealed normal strength, normal sensation.  Reflexes were symmetric and plantars were downgoing.  Coordination tests are intact.     Impression: At this point this 59-year-old gentleman with cervical facet arthropathy at C4-5 and C5-6 resulting in left-sided neck and shoulder pain.  There is also some chest wall pain associated.  He has a number of muscles affected in the neck and shoulder region and chest wall region in a focal dystonia pattern.  He has received and benefited from trigger point injections.  I recommended to be careful with massages and tractions and avoid them when possible.  I also encouraged him to use some ice on a as needed basis.    I encouraged him to take 1000 mg of Tylenol every 8 hours.  He is on Pepcid and has already tried naproxen without relief.     I will wait for neurotoxin therapy to treat the affected muscles as that will give him  longer periods of relief while avoiding lidocaine Marcaine and steroids.  He is interested in this.  Will treat his cervical facets as scheduled.     This was reviewed at length with the patient.  Patient would like to proceed with the treatment.  30 minutes visit, greater than 50% was for counseling and coordination of above-mentioned issues..     All questions were answered to his and his sister-in-law's satisfaction.     Flavio Alberts MD

## 2024-10-22 NOTE — NURSING NOTE
Heber Simon is a 59 year old male who presents for:  Chief Complaint   Patient presents with    Follow Up     TPI follow up symptoms returned about 7 days after injections       Initial Vitals: /70 (BP Location: Left arm, Patient Position: Supine, Cuff Size: Adult Regular)   Pulse 99   SpO2 97%   BP cuff size: regular      Leda Pelayo MA

## 2024-10-22 NOTE — LETTER
10/22/2024       RE: Heber Simon  [address]  [place] [address]     Dear Colleague,    Thank you for referring your patient, Heber Simon, to the [facility] at [facility]. Please see a copy of my visit note below.    CC: Patient with chronic left neck shoulder and chest pain.     He was last seen by me on 10/9/2024.  He received trigger point injections to multiple muscles in the neck and shoulder and chest wall on the left side.  He felt it was helpful for about 8 days and then got worse.    He is awaiting approval of neurotoxin.  He is scheduled for the medial branch blocks of the neck on the left side in December.    He was seen by me on 9/25/2024.  His interval history is notable for getting 2 days worth of good relief with the pain.  However he ended up getting massage therapy and his pain shot up to 8-9 out of 10.     He also has a traction unit that he has used in the past.  I had cautioned him from using it due to the significant narrowing and impingement that he has in the cervical spine.     Patient is a very pleasant 59-year-old disabled  with a history of COPD.  He has been dealing with chronic neck pain for many years but has been worse since March of this year.  He rates his pain at its worst at 10 at best a 7 and currently and 9.  Pain would radiate into the chest from the back and notes that it feels searing.  It has also affected his left hand functioning with clumsiness.  He has tried naproxen but had problems with GI and had to stop.  He has completed physical therapy and chiropractic without help.  He has no history of prior surgeries.  He had initially quit smoking but has resumed due to the pain.  He was recently seen by the neurosurgeon.  He had undergone MRI of the cervical spine on 8/2/2024.  This was at [facility].  This did show moderate C3-4 spinal stenosis with retrolisthesis and 3 to  4 mm paracentral disc protrusion impinging the cord.  Mild C5-6 and C6-7 spinal stenosis and severe bilateral C6-7 and left more so than right C5-6 neuroforaminal stenosis.    No neurosurgical intervention was felt indicated.  Dr. Mancuso did not see significant central canal or neuroforaminal stenosis.     PMH:  1. Acute hypoxic respiratory failure secondary to acute COPD exacerbation  2. Anxiety disorder  3. Hyperlipidemia  4. Chronic constipation  5. Hyponatremia   6. schizoaffective disorder  7. Bipolar type 1      Past Surgical History   No past surgical history on file.         There is family history of back pain in his brother.     Social History   Social History                Socioeconomic History     Marital status: Single       Spouse name: Not on file     Number of children: Not on file     Years of education: Not on file     Highest education level: Not on file   Occupational History     Not on file   Tobacco Use     Smoking status: Every Day       Types: Cigarettes     Smokeless tobacco: Never   Vaping Use     Vaping status: Never Used   Substance and Sexual Activity     Alcohol use: Not on file     Drug use: Not on file     Sexual activity: Not on file   Other Topics Concern     Not on file   Social History Narrative     Not on file      Social Determinants of Health      Financial Resource Strain: Not on file   Food Insecurity: Not on file   Transportation Needs: Not on file   Physical Activity: Not on file   Stress: Not on file   Social Connections: Not on file   Interpersonal Safety: Not on file   Housing Stability: Not on file         Current Outpatient Medications   Medication Sig Dispense Refill     acetaminophen (TYLENOL) 500 MG tablet Take 1,000 mg by mouth.       albuterol (PROAIR HFA/PROVENTIL HFA/VENTOLIN HFA) 108 (90 Base) MCG/ACT inhaler INHALE 2 PUFFS BY MOUTH EVERY 4 HOURS AS NEEDED FOR BREATHING , WAIT AT LEAST 1-5 MINUTES BETWEEN EACH PUFF       budesonide-formoterol (SYMBICORT) 160-4.5  MCG/ACT Inhaler Inhale 2 Inhalations into the lungs.       clonazePAM (KLONOPIN) 0.5 MG tablet Take 0.5 mg by mouth.       cloZAPine (CLOZARIL) 100 MG tablet Take 200 mg by mouth.       divalproex sodium extended-release (DEPAKOTE ER) 250 MG 24 hr tablet 250 mg.       famotidine (PEPCID) 20 MG tablet 20 mg.       venlafaxine (EFFEXOR XR) 75 MG 24 hr capsule 225 mg.            /70 (BP Location: Left arm, Patient Position: Supine, Cuff Size: Adult Regular)   Pulse 99   SpO2 97%       On examination, patient is alert and cooperative.  He has COPD.     Tremor noted uppers greater than lowers.     HEENT is otherwise unremarkable.  Extraocular movements are intact.  Face is symmetric.  Tongue is midline.  Neck is supple.  He is able to move his upper extremities.  He has discomfort with the left shoulder region.  There is not much tenderness in the subacromial space, coracoid process or the AC joint.  There is tenderness of the cervical facets especially left C4-5 and C5-6.  The right side was not tender.  There is tenderness over the left trapezius, levator scapulae and the neck.  There is involvement of the rhomboids minor rhomboids major infraspinatus, and pectoralis major in the chest.     He has protuberant abdomen.  No tenderness noted over the lumbar spine.  SI was nontender.     He is able to stand.  Romberg is negative.  Gait is unremarkable.  Neurological examination revealed normal strength, normal sensation.  Reflexes were symmetric and plantars were downgoing.  Coordination tests are intact.     Impression: At this point this 59-year-old gentleman with cervical facet arthropathy at C4-5 and C5-6 resulting in left-sided neck and shoulder pain.  There is also some chest wall pain associated.  He has a number of muscles affected in the neck and shoulder region and chest wall region in a focal dystonia pattern.  He has received and benefited from trigger point injections.  I recommended to be careful with  massages and tractions and avoid them when possible.  I also encouraged him to use some ice on a as needed basis.    I encouraged him to take 1000 mg of Tylenol every 8 hours.  He is on Pepcid and has already tried naproxen without relief.     I will wait for neurotoxin therapy to treat the affected muscles as that will give him longer periods of relief while avoiding lidocaine Marcaine and steroids.  He is interested in this.  Will treat his cervical facets as scheduled.     This was reviewed at length with the patient.  Patient would like to proceed with the treatment.  30 minutes visit, greater than 50% was for counseling and coordination of above-mentioned issues..     All questions were answered to his and his sister-in-law's satisfaction.     Flavio Alberts MD       Again, thank you for allowing me to participate in the care of your patient.      Sincerely,    Flavio Alberts MD

## 2024-11-06 ENCOUNTER — TRANSFERRED RECORDS (OUTPATIENT)
Dept: HEALTH INFORMATION MANAGEMENT | Facility: CLINIC | Age: 59
End: 2024-11-06

## 2024-11-08 ENCOUNTER — OFFICE VISIT (OUTPATIENT)
Dept: PHYSICAL MEDICINE AND REHAB | Facility: CLINIC | Age: 59
End: 2024-11-08

## 2024-11-08 DIAGNOSIS — G24.3 CERVICAL DYSTONIA: ICD-10-CM

## 2024-11-08 DIAGNOSIS — M62.838 SPASM OF MUSCLE: ICD-10-CM

## 2024-11-08 DIAGNOSIS — G24.8 FOCAL DYSTONIA: Primary | ICD-10-CM

## 2024-11-08 DIAGNOSIS — G89.29 OTHER CHRONIC PAIN: ICD-10-CM

## 2024-11-08 NOTE — PROGRESS NOTES
CC: Patient with chronic left neck shoulder and chest pain.  Here for treatment with Botox.     He was last seen by me on 10/22/2024.  Previous trigger point injections have helped him for about 8 days and then it got worse.    He was seen by me on 10/9/2024.  He received trigger point injections to multiple muscles in the neck and shoulder and chest wall on the left side.  He felt it was helpful for about 8 days and then got worse.     He is scheduled for the medial branch blocks of the neck on the left side in December.     He was seen by me on 9/25/2024.  His interval history is notable for getting 2 days worth of good relief with the pain.  However he ended up getting massage therapy and his pain shot up to 8-9 out of 10.     He also has a traction unit that he has used in the past.  I had cautioned him from using it due to the significant narrowing and impingement that he has in the cervical spine.     Patient is a very pleasant 59-year-old disabled  with a history of COPD.  He has been dealing with chronic neck pain for many years but has been worse since March of this year.  He rates his pain at its worst at 10 at best a 7 and currently and 9.  Pain would radiate into the chest from the back and notes that it feels searing.  It has also affected his left hand functioning with clumsiness.  He has tried naproxen but had problems with GI and had to stop.  He has completed physical therapy and chiropractic without help.  He has no history of prior surgeries.  He had initially quit smoking but has resumed due to the pain.  He was recently seen by the neurosurgeon.  He had undergone MRI of the cervical spine on 8/2/2024.  This was at [facility].  This did show moderate C3-4 spinal stenosis with retrolisthesis and 3 to 4 mm paracentral disc protrusion impinging the cord.  Mild C5-6 and C6-7 spinal stenosis and severe bilateral C6-7 and left more so than right C5-6 neuroforaminal stenosis.     No neurosurgical  intervention was felt indicated.  Dr. Mancuso did not see significant central canal or neuroforaminal stenosis.     PMH:  1. Acute hypoxic respiratory failure secondary to acute COPD exacerbation  2. Anxiety disorder  3. Hyperlipidemia  4. Chronic constipation  5. Hyponatremia   6. schizoaffective disorder  7. Bipolar type 1      Past Surgical History   No past surgical history on file.         There is family history of back pain in his brother.     Social History   Social History                Socioeconomic History    Marital status: Single       Spouse name: Not on file    Number of children: Not on file    Years of education: Not on file    Highest education level: Not on file   Occupational History    Not on file   Tobacco Use    Smoking status: Every Day       Types: Cigarettes    Smokeless tobacco: Never   Vaping Use    Vaping status: Never Used   Substance and Sexual Activity    Alcohol use: Not on file    Drug use: Not on file    Sexual activity: Not on file   Other Topics Concern    Not on file   Social History Narrative    Not on file      Social Determinants of Health      Financial Resource Strain: Not on file   Food Insecurity: Not on file   Transportation Needs: Not on file   Physical Activity: Not on file   Stress: Not on file   Social Connections: Not on file   Interpersonal Safety: Not on file   Housing Stability: Not on file         Current Outpatient Medications   Medication Sig Dispense Refill    acetaminophen (TYLENOL) 500 MG tablet Take 1,000 mg by mouth.      albuterol (PROAIR HFA/PROVENTIL HFA/VENTOLIN HFA) 108 (90 Base) MCG/ACT inhaler INHALE 2 PUFFS BY MOUTH EVERY 4 HOURS AS NEEDED FOR BREATHING , WAIT AT LEAST 1-5 MINUTES BETWEEN EACH PUFF      budesonide-formoterol (SYMBICORT) 160-4.5 MCG/ACT Inhaler Inhale 2 Inhalations into the lungs.      clonazePAM (KLONOPIN) 0.5 MG tablet Take 0.5 mg by mouth.      cloZAPine (CLOZARIL) 100 MG tablet Take 200 mg by mouth.      divalproex sodium  extended-release (DEPAKOTE ER) 250 MG 24 hr tablet 250 mg.      famotidine (PEPCID) 20 MG tablet 20 mg.      venlafaxine (EFFEXOR XR) 75 MG 24 hr capsule 225 mg.            There were no vitals taken for this visit.      On examination, patient is alert and cooperative.  He has COPD.     Tremor noted uppers greater than lowers.     HEENT is otherwise unremarkable.  Extraocular movements are intact.  Face is symmetric.  Tongue is midline.  Neck is supple.  He is able to move his upper extremities.  He has discomfort with the left shoulder region.  There is not much tenderness in the subacromial space, coracoid process or the AC joint.  There is tenderness of the cervical facets especially left C4-5 and C5-6.  The right side was not tender.  There is tenderness over the left trapezius, levator scapulae and the neck.  There is involvement of the rhomboids minor rhomboids major infraspinatus, and pectoralis major in the chest.     He has protuberant abdomen.  No tenderness noted over the lumbar spine.  SI was nontender.     He is able to stand.  Romberg is negative.  Gait is unremarkable.  Neurological examination revealed normal strength, normal sensation.  Reflexes were symmetric and plantars were downgoing.  Coordination tests are intact.     Impression: At this point this 59-year-old gentleman with cervical facet arthropathy at C4-5 and C5-6 resulting in left-sided neck and shoulder pain.  There is also some chest wall pain associated.  He has a number of muscles affected in the neck and shoulder region and chest wall region in a focal dystonia pattern.  He has received and benefited from trigger point injections.  I recommended to be careful with massages and tractions and avoid them when possible.  I also encouraged him to use some ice on a as needed basis.     I encouraged him to take 1000 mg of Tylenol every 8 hours.  He is on Pepcid and has already tried naproxen without relief.     We will go with 100 units of  Botox to treat the affected muscles as that will give him longer periods of relief while avoiding lidocaine Marcaine and steroids.  Will treat his cervical facets as scheduled.    PROCEDURE NOTE: With his informed consent, after explaining the benefits and risks of the procedure, using ChloraPrep for skin prep, EMG for localization, preservative-free normal saline for dilution, 100 units of Botox diluted with 2 cc of saline, 12.5 units were injected into the left semispinalis capitis, trapezius, levator scapulae, rhomboids minor, rhomboids major, and pectoralis major.  Reminder were injected into the left infraspinatus at 3 different locations.  100 units were utilized in all.    I have asked him to ice the region, anticipate the onset of Botox within 1 to 3 days, peak in 2 weeks.  I like to see him in follow-up in about 4 weeks time.  Depending on his response, I might consider going up on the Botox dose to 200 units.    20 minutes visit, exclusive of the procedure time, greater than 50% was for counseling and coordination of above-mentioned issues..     All questions were answered to his and his sister-in-law's satisfaction.     Flavio Alberts MD

## 2024-11-08 NOTE — LETTER
11/8/2024       RE: Heber Simon  [address]  [place] [address]     Dear Colleague,    Thank you for referring your patient, Heber Simon, to the [facility] at [facility]. Please see a copy of my visit note below.    CC: Patient with chronic left neck shoulder and chest pain.  Here for treatment with Botox.     He was last seen by me on 10/22/2024.  Previous trigger point injections have helped him for about 8 days and then it got worse.    He was seen by me on 10/9/2024.  He received trigger point injections to multiple muscles in the neck and shoulder and chest wall on the left side.  He felt it was helpful for about 8 days and then got worse.     He is scheduled for the medial branch blocks of the neck on the left side in December.     He was seen by me on 9/25/2024.  His interval history is notable for getting 2 days worth of good relief with the pain.  However he ended up getting massage therapy and his pain shot up to 8-9 out of 10.     He also has a traction unit that he has used in the past.  I had cautioned him from using it due to the significant narrowing and impingement that he has in the cervical spine.     Patient is a very pleasant 59-year-old disabled  with a history of COPD.  He has been dealing with chronic neck pain for many years but has been worse since March of this year.  He rates his pain at its worst at 10 at best a 7 and currently and 9.  Pain would radiate into the chest from the back and notes that it feels searing.  It has also affected his left hand functioning with clumsiness.  He has tried naproxen but had problems with GI and had to stop.  He has completed physical therapy and chiropractic without help.  He has no history of prior surgeries.  He had initially quit smoking but has resumed due to the pain.  He was recently seen by the neurosurgeon.  He had undergone MRI of the  cervical spine on 8/2/2024.  This was at [facility].  This did show moderate C3-4 spinal stenosis with retrolisthesis and 3 to 4 mm paracentral disc protrusion impinging the cord.  Mild C5-6 and C6-7 spinal stenosis and severe bilateral C6-7 and left more so than right C5-6 neuroforaminal stenosis.     No neurosurgical intervention was felt indicated.  Dr. Mancuso did not see significant central canal or neuroforaminal stenosis.     PMH:  1. Acute hypoxic respiratory failure secondary to acute COPD exacerbation  2. Anxiety disorder  3. Hyperlipidemia  4. Chronic constipation  5. Hyponatremia   6. schizoaffective disorder  7. Bipolar type 1      Past Surgical History   No past surgical history on file.         There is family history of back pain in his brother.     Social History   Social History                Socioeconomic History     Marital status: Single       Spouse name: Not on file     Number of children: Not on file     Years of education: Not on file     Highest education level: Not on file   Occupational History     Not on file   Tobacco Use     Smoking status: Every Day       Types: Cigarettes     Smokeless tobacco: Never   Vaping Use     Vaping status: Never Used   Substance and Sexual Activity     Alcohol use: Not on file     Drug use: Not on file     Sexual activity: Not on file   Other Topics Concern     Not on file   Social History Narrative     Not on file      Social Determinants of Health      Financial Resource Strain: Not on file   Food Insecurity: Not on file   Transportation Needs: Not on file   Physical Activity: Not on file   Stress: Not on file   Social Connections: Not on file   Interpersonal Safety: Not on file   Housing Stability: Not on file         Current Outpatient Medications   Medication Sig Dispense Refill     acetaminophen (TYLENOL) 500 MG tablet Take 1,000 mg by mouth.       albuterol (PROAIR HFA/PROVENTIL HFA/VENTOLIN HFA) 108 (90 Base) MCG/ACT inhaler INHALE 2 PUFFS BY MOUTH EVERY 4  HOURS AS NEEDED FOR BREATHING , WAIT AT LEAST 1-5 MINUTES BETWEEN EACH PUFF       budesonide-formoterol (SYMBICORT) 160-4.5 MCG/ACT Inhaler Inhale 2 Inhalations into the lungs.       clonazePAM (KLONOPIN) 0.5 MG tablet Take 0.5 mg by mouth.       cloZAPine (CLOZARIL) 100 MG tablet Take 200 mg by mouth.       divalproex sodium extended-release (DEPAKOTE ER) 250 MG 24 hr tablet 250 mg.       famotidine (PEPCID) 20 MG tablet 20 mg.       venlafaxine (EFFEXOR XR) 75 MG 24 hr capsule 225 mg.            There were no vitals taken for this visit.      On examination, patient is alert and cooperative.  He has COPD.     Tremor noted uppers greater than lowers.     HEENT is otherwise unremarkable.  Extraocular movements are intact.  Face is symmetric.  Tongue is midline.  Neck is supple.  He is able to move his upper extremities.  He has discomfort with the left shoulder region.  There is not much tenderness in the subacromial space, coracoid process or the AC joint.  There is tenderness of the cervical facets especially left C4-5 and C5-6.  The right side was not tender.  There is tenderness over the left trapezius, levator scapulae and the neck.  There is involvement of the rhomboids minor rhomboids major infraspinatus, and pectoralis major in the chest.     He has protuberant abdomen.  No tenderness noted over the lumbar spine.  SI was nontender.     He is able to stand.  Romberg is negative.  Gait is unremarkable.  Neurological examination revealed normal strength, normal sensation.  Reflexes were symmetric and plantars were downgoing.  Coordination tests are intact.     Impression: At this point this 59-year-old gentleman with cervical facet arthropathy at C4-5 and C5-6 resulting in left-sided neck and shoulder pain.  There is also some chest wall pain associated.  He has a number of muscles affected in the neck and shoulder region and chest wall region in a focal dystonia pattern.  He has received and benefited from  trigger point injections.  I recommended to be careful with massages and tractions and avoid them when possible.  I also encouraged him to use some ice on a as needed basis.     I encouraged him to take 1000 mg of Tylenol every 8 hours.  He is on Pepcid and has already tried naproxen without relief.     We will go with 100 units of Botox to treat the affected muscles as that will give him longer periods of relief while avoiding lidocaine Marcaine and steroids.  Will treat his cervical facets as scheduled.    PROCEDURE NOTE: With his informed consent, after explaining the benefits and risks of the procedure, using ChloraPrep for skin prep, EMG for localization, preservative-free normal saline for dilution, 100 units of Botox diluted with 2 cc of saline, 12.5 units were injected into the left semispinalis capitis, trapezius, levator scapulae, rhomboids minor, rhomboids major, and pectoralis major.  Reminder were injected into the left infraspinatus at 3 different locations.  100 units were utilized in all.    I have asked him to ice the region, anticipate the onset of Botox within 1 to 3 days, peak in 2 weeks.  I like to see him in follow-up in about 4 weeks time.  Depending on his response, I might consider going up on the Botox dose to 200 units.    20 minutes visit, exclusive of the procedure time, greater than 50% was for counseling and coordination of above-mentioned issues..     All questions were answered to his and his sister-in-law's satisfaction.     Flavio Alberts MD       Again, thank you for allowing me to participate in the care of your patient.      Sincerely,    Flavio Alberts MD

## 2024-12-02 ENCOUNTER — HOSPITAL ENCOUNTER (OUTPATIENT)
Facility: AMBULATORY SURGERY CENTER | Age: 59
Discharge: HOME | End: 2024-12-02
Attending: PHYSICAL MEDICINE & REHABILITATION | Admitting: PHYSICAL MEDICINE & REHABILITATION
Payer: COMMERCIAL

## 2024-12-02 ENCOUNTER — ANCILLARY PROCEDURE (OUTPATIENT)
Dept: RADIOLOGY | Facility: AMBULATORY SURGERY CENTER | Age: 59
End: 2024-12-02
Attending: PHYSICAL MEDICINE & REHABILITATION
Payer: COMMERCIAL

## 2024-12-02 VITALS
OXYGEN SATURATION: 94 % | HEART RATE: 99 BPM | DIASTOLIC BLOOD PRESSURE: 87 MMHG | TEMPERATURE: 98.2 F | RESPIRATION RATE: 1 BRPM | SYSTOLIC BLOOD PRESSURE: 115 MMHG

## 2024-12-02 DIAGNOSIS — M47.812 CERVICAL SPONDYLOSIS WITHOUT MYELOPATHY: ICD-10-CM

## 2024-12-02 PROCEDURE — 64490 INJ PARAVERT F JNT C/T 1 LEV: CPT | Mod: LT,KX

## 2024-12-02 RX ORDER — IOPAMIDOL 408 MG/ML
INJECTION, SOLUTION INTRATHECAL DAILY PRN
Status: DISCONTINUED | OUTPATIENT
Start: 2024-12-02 | End: 2024-12-02 | Stop reason: HOSPADM

## 2024-12-02 RX ORDER — BUPIVACAINE HYDROCHLORIDE 5 MG/ML
INJECTION, SOLUTION EPIDURAL; INTRACAUDAL DAILY PRN
Status: DISCONTINUED | OUTPATIENT
Start: 2024-12-02 | End: 2024-12-02 | Stop reason: HOSPADM

## 2024-12-02 RX ORDER — LIDOCAINE HYDROCHLORIDE 10 MG/ML
INJECTION, SOLUTION EPIDURAL; INFILTRATION; INTRACAUDAL; PERINEURAL DAILY PRN
Status: DISCONTINUED | OUTPATIENT
Start: 2024-12-02 | End: 2024-12-02 | Stop reason: HOSPADM

## 2024-12-02 NOTE — OP NOTE
"5TH FLOOR  [facility] [id number] [phone]        Pre-procedure Diagnoses   Cervical spondylosis without myelopathy [M47.812]   Post-procedure Diagnoses   Cervical spondylosis without myelopathy [M47.812]   Procedures   NV FACET/MEDIAL BRANCH INJ CERV/THOR 1ST W FLUORO [[id number]]   NV FACET/MEDIAL BRANCH INJ CERV/THOR 2ND W FLUORO [[id number]]            Procedure Note:     The patient is here undergoing LEFT cervical medial branch blocks from C4-C6, for chronic neck and shoulder pain.     After informed consent, the patient was taken to the fluoroscopic suite and placed prone on the table.  The area was prepped and draped in a sterile fashion.  Timeout was carried out identifying the site and the sites.  Using 1% lidocaine topical anesthesia was obtained.  Using fluoroscopy the  waist of C4, C5 and C6 facets on the left side were identified and marked.  Using 25-gauge 3.5\" needle the above spots were accessed to target the medial branches for C4-C5 and C5-C6..     Using Isovue-200 for contrast, there was no spread intravascularly.  Using 0.25% Marcaine 0.5 cc was injected at each levels and an additional 0.5 cc was injected as the needle was withdrawn. He tolerated the procedure well.  His pain improved immediately post injection from 8/10 to 4/10.  There was no change in strength or sensation.  He will keep a pain diary for the next 3 to 6 hours and return for diagnostic block in 2 week's time.     Thank you much problem with assist in his care.     Flavio Alberts MD        "

## 2024-12-02 NOTE — DISCHARGE INSTRUCTIONS
Home Care Instructions after a Medial Branch Block      In a medial branch block, a local anesthetic (numbing medicine) is injected near the medial branch nerve. This stops the transmission of pain signals from the facet joint. If this reduces your pain and improves your mobility, it may tell the doctor which facet joint is causing the pain. This procedure is a diagnostic procedure and is typically short lasting, with intentions of doing a radiofrequency ablation. With this injection, a steroid to increase the longevity of the blocks effect is rarely used.    Activity  -You may resume most normal activity levels with the exception of strenuous activity. It is important for us to know if your pain with normal activity is relieved after this injection.  -DO NOT shower for 24 hours  -DO NOT remove bandaid for 24 hours    Pain  -You may experience soreness at the injection site for one or two days  -You may use an ice pack for 20 minutes every 2 hours for the first 24 hours  -You may use a heating pad after the first 24 hours  -You may use Tylenol (acetaminophen) every 4 hours or other pain medicines as     directed by your physician    You may experience numbness radiating into your legs or arms (depending on the procedure location). This numbness may last several hours. Until sensation returns to normal; please use caution in walking, climbing stairs, and stepping out of your vehicle, etc.      PLEASE KEEP TRACK OF YOUR SYMPTOMS AND NOTE YOUR IMPROVEMENT FOR YOUR DOCTOR.     Fill out the pain diary and fax it back to us. You do not need to call us if the pain diary was faxed. [phone] is the FAX number  If you do not have access to a fax machine, attach it to [employer].  You do not need to call us if the pain diary was attached to [employer].   If you cannot fax or send via [employer], then call our call center and request to speak with a nurse for follow up after a procedure       Please contact us if you have:  -Severe  pain  -Fever more than 101.5 degrees Fahrenheit  -Signs of infection at the injection site (redness, swelling, or drainage)    FOR PAIN CENTER PATIENTS:  If you have questions, please contact the Pain Clinic at [phone] Option #1 between the hours of 7:00 am and 3:00 pm Monday through Friday. After office hours you can contact the on call provider by dialing [phone]. If you need immediate attention, we recommend that you go to a hospital emergency room or dial [phone]. Fax number is [phone]      FOR PM&R PATIENTS:  For patients seen by the PM and R service, please call [phone]. (Monday through Friday 8a-5p).  After business hours and weekends call [phone] and ask for the PM and R doctor on call). If you need to fax a pain diary to PM&R the fax number is [phone]. If you are unable to fax, uploading to [employer] is encouraged, then send to provider. If you have procedure scheduling questions please call [phone] Option #2.

## 2024-12-03 ENCOUNTER — MYC MEDICAL ADVICE (OUTPATIENT)
Dept: PHYSICAL MEDICINE AND REHAB | Facility: CLINIC | Age: 59
End: 2024-12-03

## 2024-12-04 NOTE — TELEPHONE ENCOUNTER
Procedure follow-up:    Follow up after Left C4-C6 Medial Branch Block # 1  Date of service: 12/2/24  Information reviewed via Pain Diary summary received from pt via My Chart and a phone call review of his results as well.    Location of pain: chronic neck and shoulder pain   Percentage & Duration of pain relief after procedure:     Patient reports that he had a delayed response to the injection, that he has pain from the position he had to lay in for the procedure initially, but that at 5 AM the following morning he had 100% relief in pain that lasted for over 12 hours and was able to walk his dog for the first time in 9 months because his symptoms felt so improved.     He looks forward to getting the second diagnostic injection on 12/16/24 with hopes to be able to proceed to the RFA.     Follow up: Routed to Dr. Alberts to review.     AVELINA GardinerN RN  RN Care Coordinator for Physical Medicine and Rehabilitation  [facility] [address] [address]  Office: [phone]  Fax: [phone]

## 2024-12-06 ENCOUNTER — OFFICE VISIT (OUTPATIENT)
Dept: PHYSICAL MEDICINE AND REHAB | Facility: CLINIC | Age: 59
End: 2024-12-06
Payer: COMMERCIAL

## 2024-12-06 VITALS — HEART RATE: 102 BPM | OXYGEN SATURATION: 94 % | DIASTOLIC BLOOD PRESSURE: 73 MMHG | SYSTOLIC BLOOD PRESSURE: 114 MMHG

## 2024-12-06 DIAGNOSIS — M47.812 CERVICAL SPONDYLOSIS WITHOUT MYELOPATHY: Primary | ICD-10-CM

## 2024-12-06 DIAGNOSIS — M75.42 IMPINGEMENT SYNDROME, SHOULDER, LEFT: ICD-10-CM

## 2024-12-06 DIAGNOSIS — G24.8 FOCAL DYSTONIA: ICD-10-CM

## 2024-12-06 DIAGNOSIS — G89.29 OTHER CHRONIC PAIN: ICD-10-CM

## 2024-12-06 PROCEDURE — 99214 OFFICE O/P EST MOD 30 MIN: CPT | Performed by: PHYSICAL MEDICINE & REHABILITATION

## 2024-12-06 ASSESSMENT — PAIN SCALES - GENERAL: PAINLEVEL_OUTOF10: SEVERE PAIN (6)

## 2024-12-06 NOTE — NURSING NOTE
Heber Simon is a 59 year old male who presents for:  Chief Complaint   Patient presents with    Follow Up     Botox follow up, had some improvement but pain has returned       Initial Vitals: /73 (BP Location: Left arm, Patient Position: Supine, Cuff Size: Adult Regular)   Pulse 102   SpO2 94%   BP cuff size: regular      Leda Pelayo MA

## 2024-12-06 NOTE — LETTER
12/6/2024       RE: Heber Simon  [address]  [place] [address]     Dear Colleague,    Thank you for referring your patient, Heber Simon, to the [facility] at [facility]. Please see a copy of my visit note below.    CC: Patient with chronic left neck shoulder and chest pain.  Here for treatment with Botox.    Patient returns for a follow-up visit for his ongoing issues related to left neck shoulder and chest wall pain.  He was last seen by me on December 2, 2024.  He did find the blocks very helpful.  It still helping him after 3 days.  He still has areas of tenderness in the muscles of the shoulder for which she has received Botox on November 8, 2024.  He is here for reassessment.     He was seen by me on 10/22/2024.  Previous trigger point injections have helped him for about 8 days and then it got worse.     He was seen by me on 10/9/2024.  He received trigger point injections to multiple muscles in the neck and shoulder and chest wall on the left side.  He felt it was helpful for about 8 days and then got worse.     He is in the midst of getting medial branch blocks of the neck on the left side in December.     He was seen by me on 9/25/2024.  His interval history is notable for getting 2 days worth of good relief with the pain.  However he ended up getting massage therapy and his pain shot up to 8-9 out of 10.     He also has a traction unit that he has used in the past.  I had cautioned him from using it due to the significant narrowing and impingement that he has in the cervical spine.     Patient is a very pleasant 59-year-old disabled  with a history of COPD.  He has been dealing with chronic neck pain for many years but has been worse since March of this year.  He rates his pain at its worst at 10 at best a 7 and currently and 9.  Pain would radiate into the chest from the back and notes that it  feels searing.  It has also affected his left hand functioning with clumsiness.  He has tried naproxen but had problems with GI and had to stop.  He has completed physical therapy and chiropractic without help.  He has no history of prior surgeries.  He had initially quit smoking but has resumed due to the pain.  He was recently seen by the neurosurgeon.  He had undergone MRI of the cervical spine on 8/2/2024.  This was at [facility].  This did show moderate C3-4 spinal stenosis with retrolisthesis and 3 to 4 mm paracentral disc protrusion impinging the cord.  Mild C5-6 and C6-7 spinal stenosis and severe bilateral C6-7 and left more so than right C5-6 neuroforaminal stenosis.     No neurosurgical intervention was felt indicated.  Dr. Mancuso did not see significant central canal or neuroforaminal stenosis.     PMH:  1. Acute hypoxic respiratory failure secondary to acute COPD exacerbation  2. Anxiety disorder  3. Hyperlipidemia  4. Chronic constipation  5. Hyponatremia   6. schizoaffective disorder  7. Bipolar type 1      Past Surgical History   No past surgical history on file.         There is family history of back pain in his brother.     Social History   Social History                Socioeconomic History     Marital status: Single       Spouse name: Not on file     Number of children: Not on file     Years of education: Not on file     Highest education level: Not on file   Occupational History     Not on file   Tobacco Use     Smoking status: Every Day       Types: Cigarettes     Smokeless tobacco: Never   Vaping Use     Vaping status: Never Used   Substance and Sexual Activity     Alcohol use: Not on file     Drug use: Not on file     Sexual activity: Not on file   Other Topics Concern     Not on file   Social History Narrative     Not on file      Social Determinants of Health      Financial Resource Strain: Not on file   Food Insecurity: Not on file   Transportation Needs: Not on file   Physical Activity: Not on  file   Stress: Not on file   Social Connections: Not on file   Interpersonal Safety: Not on file   Housing Stability: Not on file         Current Outpatient Medications   Medication Sig Dispense Refill     acetaminophen (TYLENOL) 500 MG tablet Take 1,000 mg by mouth.       albuterol (PROAIR HFA/PROVENTIL HFA/VENTOLIN HFA) 108 (90 Base) MCG/ACT inhaler INHALE 2 PUFFS BY MOUTH EVERY 4 HOURS AS NEEDED FOR BREATHING , WAIT AT LEAST 1-5 MINUTES BETWEEN EACH PUFF       budesonide-formoterol (SYMBICORT) 160-4.5 MCG/ACT Inhaler Inhale 2 Inhalations into the lungs.       clonazePAM (KLONOPIN) 0.5 MG tablet Take 0.5 mg by mouth.       cloZAPine (CLOZARIL) 100 MG tablet Take 200 mg by mouth.       divalproex sodium extended-release (DEPAKOTE ER) 250 MG 24 hr tablet 250 mg.       famotidine (PEPCID) 20 MG tablet 20 mg.       venlafaxine (EFFEXOR XR) 75 MG 24 hr capsule 225 mg.         /73 (BP Location: Left arm, Patient Position: Supine, Cuff Size: Adult Regular)   Pulse 102   SpO2 94%       On examination, patient is alert and cooperative.  He has COPD.     Tremor noted uppers greater than lowers.     HEENT is otherwise unremarkable.  Extraocular movements are intact.  Face is symmetric.  Tongue is midline.  Neck is supple.  He is able to move his upper extremities.  He is mildly tender over the left AC joint.  There is not much tenderness in the subacromial space, coracoid process.  There is np tenderness of the cervical facets especially left C4-5 and C5-6.  The right side was not tender.  There are still areas of tenderness over the left trapezius, levator scapulae and the neck.  There is involvement of the rhomboids minor rhomboids major infraspinatus, and pectoralis major in the chest.     He has protuberant abdomen.  No tenderness noted over the lumbar spine.  SI was nontender.     He is able to stand.  Romberg is negative.  Gait is unremarkable.  Neurological examination revealed normal strength, normal  sensation.  Reflexes were symmetric and plantars were downgoing.  Coordination tests are intact.     Impression: At this point this 59-year-old gentleman with cervical facet arthropathy at C4-5 and C5-6 resulting in left-sided neck and shoulder pain.  There is also some chest wall pain associated.  He has a number of muscles affected in the neck and shoulder region and chest wall region in a Cervical dystonia and focal dystonia pattern.  He has received and benefited from trigger point injections.  He has also benefited from initial Botox where we started with 100 units.  I would recommend that we increase this to 200 units next time.  I recommended to be careful with massages and tractions and avoid them when possible.  I also encouraged him to use some ice on a as needed basis.     I encouraged him to take 1000 mg of Tylenol every 8 hours.  He is on Pepcid and has already tried naproxen without relief.     The plans were reviewed once again in great length with the patient and his family.  All questions were answered to their satisfaction.      Thank you much for allow me to assist in his care.    30 minutes visit, greater than 50% was for counseling and coordination of above-mentioned issues..       Flavio Alberts MD       Again, thank you for allowing me to participate in the care of your patient.      Sincerely,    Flavio Alberts MD

## 2024-12-06 NOTE — PROGRESS NOTES
CC: Patient with chronic left neck shoulder and chest pain.  Here for treatment with Botox.    Patient returns for a follow-up visit for his ongoing issues related to left neck shoulder and chest wall pain.  He was last seen by me on December 2, 2024.  He did find the blocks very helpful.  It still helping him after 3 days.  He still has areas of tenderness in the muscles of the shoulder for which she has received Botox on November 8, 2024.  He is here for reassessment.     He was seen by me on 10/22/2024.  Previous trigger point injections have helped him for about 8 days and then it got worse.     He was seen by me on 10/9/2024.  He received trigger point injections to multiple muscles in the neck and shoulder and chest wall on the left side.  He felt it was helpful for about 8 days and then got worse.     He is in the midst of getting medial branch blocks of the neck on the left side in December.     He was seen by me on 9/25/2024.  His interval history is notable for getting 2 days worth of good relief with the pain.  However he ended up getting massage therapy and his pain shot up to 8-9 out of 10.     He also has a traction unit that he has used in the past.  I had cautioned him from using it due to the significant narrowing and impingement that he has in the cervical spine.     Patient is a very pleasant 59-year-old disabled  with a history of COPD.  He has been dealing with chronic neck pain for many years but has been worse since March of this year.  He rates his pain at its worst at 10 at best a 7 and currently and 9.  Pain would radiate into the chest from the back and notes that it feels searing.  It has also affected his left hand functioning with clumsiness.  He has tried naproxen but had problems with GI and had to stop.  He has completed physical therapy and chiropractic without help.  He has no history of prior surgeries.  He had initially quit smoking but has resumed due to the pain.  He was  recently seen by the neurosurgeon.  He had undergone MRI of the cervical spine on 8/2/2024.  This was at [facility].  This did show moderate C3-4 spinal stenosis with retrolisthesis and 3 to 4 mm paracentral disc protrusion impinging the cord.  Mild C5-6 and C6-7 spinal stenosis and severe bilateral C6-7 and left more so than right C5-6 neuroforaminal stenosis.     No neurosurgical intervention was felt indicated.  Dr. Mancuso did not see significant central canal or neuroforaminal stenosis.     PMH:  1. Acute hypoxic respiratory failure secondary to acute COPD exacerbation  2. Anxiety disorder  3. Hyperlipidemia  4. Chronic constipation  5. Hyponatremia   6. schizoaffective disorder  7. Bipolar type 1      Past Surgical History   No past surgical history on file.         There is family history of back pain in his brother.     Social History   Social History                Socioeconomic History    Marital status: Single       Spouse name: Not on file    Number of children: Not on file    Years of education: Not on file    Highest education level: Not on file   Occupational History    Not on file   Tobacco Use    Smoking status: Every Day       Types: Cigarettes    Smokeless tobacco: Never   Vaping Use    Vaping status: Never Used   Substance and Sexual Activity    Alcohol use: Not on file    Drug use: Not on file    Sexual activity: Not on file   Other Topics Concern    Not on file   Social History Narrative    Not on file      Social Determinants of Health      Financial Resource Strain: Not on file   Food Insecurity: Not on file   Transportation Needs: Not on file   Physical Activity: Not on file   Stress: Not on file   Social Connections: Not on file   Interpersonal Safety: Not on file   Housing Stability: Not on file         Current Outpatient Medications   Medication Sig Dispense Refill    acetaminophen (TYLENOL) 500 MG tablet Take 1,000 mg by mouth.      albuterol (PROAIR HFA/PROVENTIL HFA/VENTOLIN HFA) 108 (90 Base)  MCG/ACT inhaler INHALE 2 PUFFS BY MOUTH EVERY 4 HOURS AS NEEDED FOR BREATHING , WAIT AT LEAST 1-5 MINUTES BETWEEN EACH PUFF      budesonide-formoterol (SYMBICORT) 160-4.5 MCG/ACT Inhaler Inhale 2 Inhalations into the lungs.      clonazePAM (KLONOPIN) 0.5 MG tablet Take 0.5 mg by mouth.      cloZAPine (CLOZARIL) 100 MG tablet Take 200 mg by mouth.      divalproex sodium extended-release (DEPAKOTE ER) 250 MG 24 hr tablet 250 mg.      famotidine (PEPCID) 20 MG tablet 20 mg.      venlafaxine (EFFEXOR XR) 75 MG 24 hr capsule 225 mg.         /73 (BP Location: Left arm, Patient Position: Supine, Cuff Size: Adult Regular)   Pulse 102   SpO2 94%       On examination, patient is alert and cooperative.  He has COPD.     Tremor noted uppers greater than lowers.     HEENT is otherwise unremarkable.  Extraocular movements are intact.  Face is symmetric.  Tongue is midline.  Neck is supple.  He is able to move his upper extremities.  He is mildly tender over the left AC joint.  There is not much tenderness in the subacromial space, coracoid process.  There is np tenderness of the cervical facets especially left C4-5 and C5-6.  The right side was not tender.  There are still areas of tenderness over the left trapezius, levator scapulae and the neck.  There is involvement of the rhomboids minor rhomboids major infraspinatus, and pectoralis major in the chest.     He has protuberant abdomen.  No tenderness noted over the lumbar spine.  SI was nontender.     He is able to stand.  Romberg is negative.  Gait is unremarkable.  Neurological examination revealed normal strength, normal sensation.  Reflexes were symmetric and plantars were downgoing.  Coordination tests are intact.     Impression: At this point this 59-year-old gentleman with cervical facet arthropathy at C4-5 and C5-6 resulting in left-sided neck and shoulder pain.  There is also some chest wall pain associated.  He has a number of muscles affected in the neck and  shoulder region and chest wall region in a Cervical dystonia and focal dystonia pattern.  He has received and benefited from trigger point injections.  He has also benefited from initial Botox where we started with 100 units.  I would recommend that we increase this to 200 units next time.  I recommended to be careful with massages and tractions and avoid them when possible.  I also encouraged him to use some ice on a as needed basis.     I encouraged him to take 1000 mg of Tylenol every 8 hours.  He is on Pepcid and has already tried naproxen without relief.     The plans were reviewed once again in great length with the patient and his family.  All questions were answered to their satisfaction.      Thank you much for allow me to assist in his care.    30 minutes visit, greater than 50% was for counseling and coordination of above-mentioned issues..       Flavio Alberts MD

## 2024-12-16 ENCOUNTER — HOSPITAL ENCOUNTER (OUTPATIENT)
Facility: AMBULATORY SURGERY CENTER | Age: 59
Discharge: HOME | End: 2024-12-16
Attending: PHYSICAL MEDICINE & REHABILITATION | Admitting: PHYSICAL MEDICINE & REHABILITATION
Payer: COMMERCIAL

## 2024-12-16 ENCOUNTER — ANCILLARY PROCEDURE (OUTPATIENT)
Dept: RADIOLOGY | Facility: AMBULATORY SURGERY CENTER | Age: 59
End: 2024-12-16
Attending: PHYSICAL MEDICINE & REHABILITATION

## 2024-12-16 VITALS
RESPIRATION RATE: 14 BRPM | BODY MASS INDEX: 30.74 KG/M2 | HEART RATE: 90 BPM | DIASTOLIC BLOOD PRESSURE: 81 MMHG | HEIGHT: 68 IN | SYSTOLIC BLOOD PRESSURE: 128 MMHG | OXYGEN SATURATION: 95 % | WEIGHT: 202.82 LBS | TEMPERATURE: 98.4 F

## 2024-12-16 DIAGNOSIS — M47.812 CERVICAL SPONDYLOSIS WITHOUT MYELOPATHY: ICD-10-CM

## 2024-12-16 PROBLEM — F43.12 CHRONIC POST-TRAUMATIC STRESS DISORDER (PTSD) AFTER MILITARY COMBAT: Status: ACTIVE | Noted: 2024-12-16

## 2024-12-16 PROBLEM — F17.210 NICOTINE DEPENDENCE, CIGARETTES, UNCOMPLICATED: Status: ACTIVE | Noted: 2024-12-16

## 2024-12-16 PROBLEM — Z96.1 PRESENCE OF INTRAOCULAR LENS: Status: ACTIVE | Noted: 2024-12-16

## 2024-12-16 PROBLEM — C61 PRIMARY MALIGNANT NEOPLASM OF PROSTATE (H): Status: ACTIVE | Noted: 2024-12-16

## 2024-12-16 PROBLEM — M54.12 RADICULOPATHY, CERVICAL REGION: Status: ACTIVE | Noted: 2024-12-16

## 2024-12-16 PROBLEM — J44.9 COPD (CHRONIC OBSTRUCTIVE PULMONARY DISEASE) (H): Status: ACTIVE | Noted: 2022-01-28

## 2024-12-16 PROBLEM — D72.10 EOSINOPHILIA: Status: ACTIVE | Noted: 2024-12-16

## 2024-12-16 PROBLEM — Z77.29 EXPOSURE TO POTENTIALLY HAZARDOUS SUBSTANCE: Status: ACTIVE | Noted: 2024-12-16

## 2024-12-16 PROBLEM — K03.6 DEPOSITS (ACCRETIONS) ON TEETH: Status: ACTIVE | Noted: 2024-12-16

## 2024-12-16 PROBLEM — H26.492 OTHER SECONDARY CATARACT, LEFT EYE: Status: ACTIVE | Noted: 2024-12-16

## 2024-12-16 PROBLEM — R91.8 MULTIPLE NODULES OF LUNG: Status: ACTIVE | Noted: 2024-12-16

## 2024-12-16 PROBLEM — J47.9 ADULT BRONCHIECTASIS (H): Status: ACTIVE | Noted: 2024-12-16

## 2024-12-16 PROBLEM — J82.89 PULMONARY EOSINOPHILIA (H): Status: ACTIVE | Noted: 2024-12-16

## 2024-12-16 PROBLEM — Z63.4 BEREAVEMENT: Status: ACTIVE | Noted: 2024-12-16

## 2024-12-16 PROBLEM — M47.819 SPONDYLOSIS WITHOUT MYELOPATHY: Status: ACTIVE | Noted: 2024-12-16

## 2024-12-16 PROBLEM — K57.30 DIVERTICULOSIS OF COLON: Status: ACTIVE | Noted: 2024-12-16

## 2024-12-16 PROBLEM — M54.2 CERVICALGIA: Status: ACTIVE | Noted: 2024-12-16

## 2024-12-16 PROBLEM — H16.212 EXPOSURE KERATOCONJUNCTIVITIS OF LEFT EYE: Status: ACTIVE | Noted: 2024-12-16

## 2024-12-16 PROBLEM — H93.13 SUBJECTIVE TINNITUS OF BOTH EARS: Status: ACTIVE | Noted: 2024-12-16

## 2024-12-16 PROBLEM — R53.82 CHRONIC FATIGUE, UNSPECIFIED: Status: ACTIVE | Noted: 2024-12-16

## 2024-12-16 PROBLEM — F25.0 SCHIZOAFFECTIVE DISORDER, BIPOLAR TYPE (H): Status: ACTIVE | Noted: 2022-01-28

## 2024-12-16 PROBLEM — H04.123 DRY EYES: Status: ACTIVE | Noted: 2024-12-16

## 2024-12-16 PROBLEM — F10.20 ALCOHOL DEPENDENCE (H): Status: ACTIVE | Noted: 2024-12-16

## 2024-12-16 PROBLEM — H90.3 SENSORINEURAL HEARING LOSS (SNHL) OF BOTH EARS: Status: ACTIVE | Noted: 2024-12-16

## 2024-12-16 PROBLEM — Z86.0100 PERSONAL HISTORY OF COLON POLYPS, UNSPECIFIED: Status: ACTIVE | Noted: 2024-12-16

## 2024-12-16 PROBLEM — J47.9 BRONCHIECTASIS, UNCOMPLICATED (H): Status: ACTIVE | Noted: 2024-12-16

## 2024-12-16 PROBLEM — K51.20: Status: ACTIVE | Noted: 2024-12-16

## 2024-12-16 PROCEDURE — 64490 INJ PARAVERT F JNT C/T 1 LEV: CPT | Mod: LT | Performed by: PHYSICAL MEDICINE & REHABILITATION

## 2024-12-16 PROCEDURE — 64491 INJ PARAVERT F JNT C/T 2 LEV: CPT | Mod: LT | Performed by: PHYSICAL MEDICINE & REHABILITATION

## 2024-12-16 PROCEDURE — 64490 INJ PARAVERT F JNT C/T 1 LEV: CPT | Mod: LT,KX

## 2024-12-16 RX ORDER — DOXYCYCLINE HYCLATE 100 MG
100 TABLET ORAL
COMMUNITY
Start: 2024-09-17

## 2024-12-16 RX ORDER — BENZTROPINE MESYLATE 0.5 MG/1
0.5 TABLET ORAL
COMMUNITY
Start: 2024-06-13

## 2024-12-16 RX ORDER — VARENICLINE TARTRATE 0.5 MG/1
0.5 TABLET, FILM COATED ORAL
COMMUNITY
Start: 2024-06-13

## 2024-12-16 RX ORDER — BUPIVACAINE HYDROCHLORIDE 2.5 MG/ML
INJECTION, SOLUTION INFILTRATION; PERINEURAL DAILY PRN
Status: DISCONTINUED | OUTPATIENT
Start: 2024-12-16 | End: 2024-12-16 | Stop reason: HOSPADM

## 2024-12-16 RX ORDER — PREDNISONE 20 MG/1
40 TABLET ORAL
COMMUNITY
Start: 2024-09-17

## 2024-12-16 RX ORDER — CLOZAPINE 100 MG/1
100 TABLET ORAL EVERY MORNING
COMMUNITY

## 2024-12-16 RX ORDER — IOPAMIDOL 408 MG/ML
INJECTION, SOLUTION INTRATHECAL DAILY PRN
Status: DISCONTINUED | OUTPATIENT
Start: 2024-12-16 | End: 2024-12-16 | Stop reason: HOSPADM

## 2024-12-16 RX ORDER — TAMSULOSIN HYDROCHLORIDE 0.4 MG/1
0.4 CAPSULE ORAL
COMMUNITY
Start: 2024-11-05

## 2024-12-16 RX ORDER — DIVALPROEX SODIUM 500 MG/1
1500 TABLET, FILM COATED, EXTENDED RELEASE ORAL
COMMUNITY

## 2024-12-16 RX ORDER — GUAIFENESIN 600 MG/1
600 TABLET, EXTENDED RELEASE ORAL
COMMUNITY
Start: 2023-12-14

## 2024-12-16 RX ORDER — NAPROXEN 500 MG/1
500 TABLET ORAL
COMMUNITY
Start: 2024-06-18

## 2024-12-16 RX ORDER — LIDOCAINE HYDROCHLORIDE 10 MG/ML
INJECTION, SOLUTION EPIDURAL; INFILTRATION; INTRACAUDAL; PERINEURAL DAILY PRN
Status: DISCONTINUED | OUTPATIENT
Start: 2024-12-16 | End: 2024-12-16 | Stop reason: HOSPADM

## 2024-12-16 RX ORDER — ALBUTEROL SULFATE 90 UG/1
2 INHALANT RESPIRATORY (INHALATION)
COMMUNITY

## 2024-12-16 RX ORDER — FLUTICASONE PROPIONATE AND SALMETEROL 500; 50 UG/1; UG/1
POWDER RESPIRATORY (INHALATION)
COMMUNITY
Start: 2023-12-14

## 2024-12-16 NOTE — DISCHARGE INSTRUCTIONS
Home Care Instructions after a Medial Branch Block      In a medial branch block, a local anesthetic (numbing medicine) is injected near the medial branch nerve. This stops the transmission of pain signals from the facet joint. If this reduces your pain and improves your mobility, it may tell the doctor which facet joint is causing the pain. This procedure is a diagnostic procedure and is typically short lasting, with intentions of doing a radiofrequency ablation. With this injection, a steroid to increase the longevity of the blocks effect is rarely used.    Activity  -You may resume most normal activity levels with the exception of strenuous activity. It is important for us to know if your pain with normal activity is relieved after this injection.  -DO NOT shower for 24 hours  -DO NOT remove bandaid for 24 hours    Pain  -You may experience soreness at the injection site for one or two days  -You may use an ice pack for 20 minutes every 2 hours for the first 24 hours  -You may use a heating pad after the first 24 hours  -You may use Tylenol (acetaminophen) every 4 hours or other pain medicines as     directed by your physician    You may experience numbness radiating into your legs or arms (depending on the procedure location). This numbness may last several hours. Until sensation returns to normal; please use caution in walking, climbing stairs, and stepping out of your vehicle, etc.      PLEASE KEEP TRACK OF YOUR SYMPTOMS AND NOTE YOUR IMPROVEMENT FOR YOUR DOCTOR.     Fill out the pain diary and fax it back to us. You do not need to call us if the pain diary was faxed. [phone] is the FAX number  If you do not have access to a fax machine, attach it to [employer].  You do not need to call us if the pain diary was attached to [employer].   If you cannot fax or send via [employer], then call our call center and request to speak with a nurse for follow up after a procedure       Please contact us if you have:  -Severe  pain  -Fever more than 101.5 degrees Fahrenheit  -Signs of infection at the injection site (redness, swelling, or drainage)    FOR PM&R PATIENTS:  For patients seen by the PM and R service, please call [phone]. (Monday through Friday 8a-5p).  After business hours and weekends call [phone] and ask for the PM and R doctor on call). If you need to fax a pain diary to PM&R the fax number is [phone]. If you are unable to fax, uploading to [facility] is encouraged, then send to provider. If you have procedure scheduling questions please call [phone] Option #2.

## 2024-12-16 NOTE — OP NOTE
"5TH FLOOR  [facility] [id number] [phone]        Pre-procedure Diagnoses   Cervical spondylosis without myelopathy [M47.812]   Post-procedure Diagnoses   Cervical spondylosis without myelopathy [M47.812]   Procedures   CA FACET/MEDIAL BRANCH INJ CERV/THOR 1ST W FLUORO [[id number]]   CA FACET/MEDIAL BRANCH INJ CERV/THOR 2ND W FLUORO [[id number]]             12/16/2024     Procedure Note:     The patient is here undergoing his second LEFT cervical medial branch blocks from C4-C6, for chronic neck and shoulder pain.     After informed consent, the patient was taken to the fluoroscopic suite and placed prone on the table.  The area was prepped and draped in a sterile fashion.  Timeout was carried out identifying the site and the sites.  Using 1% lidocaine topical anesthesia was obtained.  Using fluoroscopy the  waist of C4, C5 and C6 facets on the left side were identified and marked.  Using 25-gauge 3.5\" needle the above spots were accessed to target the medial branches for C4-C5 and C5-C6..     Using Isovue-200 for contrast, there was no spread intravascularly.  Using 0.25% Marcaine 0.5 cc was injected at each levels and an additional 0.5 cc was injected as the needle was withdrawn. He tolerated the procedure well.  His pain improved immediately post injection from 8/10 to 4/10.  There was no change in strength or sensation.  He will keep a pain diary for the next 3 to 6 hours and return for diagnostic block in 2 week's time.     Thank you much problem with assist in his care.     Flavio Alberts MD        "

## 2024-12-18 DIAGNOSIS — M47.812 CERVICAL SPONDYLOSIS WITHOUT MYELOPATHY: Primary | ICD-10-CM

## 2024-12-19 ENCOUNTER — TELEPHONE (OUTPATIENT)
Dept: PHYSICAL MEDICINE AND REHAB | Facility: CLINIC | Age: 59
End: 2024-12-19

## 2024-12-19 ENCOUNTER — MYC MEDICAL ADVICE (OUTPATIENT)
Dept: PHYSICAL MEDICINE AND REHAB | Facility: CLINIC | Age: 59
End: 2024-12-19

## 2024-12-19 NOTE — TELEPHONE ENCOUNTER
Called patient to schedule procedure with Dr. Alberts    Date of Procedure: 12/30/24    Arrival time given: Yes: Arrival Time 1pm       Procedure Location: [facility]     Verified Location with Patient:  Yes  Address provided to the patient     Pre-op H&P Required:  No: Moderate Sedation       Post-Op/Follow Up Appt:  Not Indicated in Request      Informed patient they will need a  to drive them home:  Yes    Patients : Spouse     Patient is aware that pre-op RN from the procedure center will call 2-3 days prior to scheduled procedure to confirm arrival time and review any instructions:  Yes       Additional Comments: N/A        Sruthi Dominguez MA on 12/19/2024 at 9:57 AM      P: [phone]*

## 2024-12-19 NOTE — TELEPHONE ENCOUNTER
Called patient and reviewed his upcoming Left C4-6 Radiofrequency Ablation injection scheduled with Dr. Alberts [facility] 12/30/[facility]th [facility].      Medications you will need to hold prior to your injection:     Aspirin containing products need to be stopped for 6 days before the injection.    Naproxen (brand name Aleve) needs to be stopped for 4 days before injection.     Ibuprofen (brand names Advil/Motrin) needs to be stopped for 1 day before the injection.       You cannot be on antibiotics or have an illness/infection at time of the injection, or within 7 days prior, if you were to become ill the procedure will need to be rescheduled. The [facility] nurse will send information out via My Chart typically 1-3 days before the procedure to review important information that you will need to know for the day of the procedure.     Procedure Information     Please call [phone] option #2 to schedule, reschedule, or cancel your procedure appointment. Leave a voicemail with your name, birth date, and phone number if no one is available to take your call.       Preparing for your procedure    Do not drink alcohol the night before or on the day of the procedure.  You must shower the night before and on the day of your procedure.  Wear comfortable, clean clothing.      You must have a  take you home after your procedure. Transportation by taxi or para-transit is okay as long as you have a responsible adult accompany you. You must provide your 's full name and contact number at time of check in.     Fasting Protocol Due to Moderate sedation that will be used for this procedure you may have NOTHING SOLID TO EAT 8 HOURS prior to arrival at the procedure area.     You may have CLEAR LIQUIDS UP TO 2 HOURS prior to arrival.    Broth and candy are considered solid food and require an eight hour fast.     Clear liquids include water, clear  "fruit juice (no pulp), carbonated beverages, ice, black coffee, black tea, clear jello. No alcohol containing beverages.   Medications If you take any medications, DO NOT STOP, with the exception of the medications noted above to hold prior to the injection. Take your medications as usual the day of your procedure with a sip of water AT LEAST 2 HOURS PRIOR TO ARRIVAL.    Antibiotics If you are currently taking antibiotics, you must complete the entire dose 7 days prior to your scheduled procedure. You must be clear of any signs or symptoms of infection. If you begin antibiotics, please contact our clinic for instructions.     Fever, Chills, or Rash If you experience a fever of higher than 100 degrees, chills, rash, or open wounds during the one week before your procedure, please call the clinic to see if you may proceed with your procedure.        How should you care for yourself at home after your injection?  Get plenty of rest and avoid twisting, bending movements, heavy lifting, or strenuous activity for the first 24 hours.    Resume your pain medications.  Apply ice packs (on for 20 minutes at a time), every 2-3 hours to your injection area for the first 2-3 days to help with pain control.    Avoid heat (pads or water bottles) for the first 48 hours. You can use heat after 48 hours.  Take showers only for the first 48 hours.  No baths, hot tubs, swimming, or soaking for 48 hours to reduce the risk of infection.     Patient verbalized understanding, states he will be finishing up an antibiotic in the next couple of days for a \"lung infection\" in the next 3 days, so would be done before the 7 days prior to his injection. He notes that if the infection is not resolved and needing to be on additional antibiotics that he will call to reschedule his injection. No questions or concerns at this time.     Advised this information will be sent to him via My Chart and to reach out if he has any questions or concerns before " or after the injection.    Rebekah Guthrie, AVELINAN RN  RN Care Coordinator for Physical Medicine and Rehabilitation  [facility] [address] [address]  Office: [phone]  Fax: [phone]

## 2024-12-30 ENCOUNTER — ANCILLARY PROCEDURE (OUTPATIENT)
Dept: RADIOLOGY | Facility: AMBULATORY SURGERY CENTER | Age: 59
End: 2024-12-30
Attending: PHYSICAL MEDICINE & REHABILITATION

## 2024-12-30 ENCOUNTER — HOSPITAL ENCOUNTER (OUTPATIENT)
Facility: AMBULATORY SURGERY CENTER | Age: 59
Discharge: HOME | End: 2024-12-30
Attending: PHYSICAL MEDICINE & REHABILITATION
Payer: COMMERCIAL

## 2024-12-30 VITALS
HEART RATE: 85 BPM | SYSTOLIC BLOOD PRESSURE: 109 MMHG | WEIGHT: 196 LBS | TEMPERATURE: 98.3 F | HEIGHT: 68 IN | DIASTOLIC BLOOD PRESSURE: 74 MMHG | OXYGEN SATURATION: 98 % | BODY MASS INDEX: 29.7 KG/M2 | RESPIRATION RATE: 16 BRPM

## 2024-12-30 DIAGNOSIS — M47.812 CERVICAL SPONDYLOSIS WITHOUT MYELOPATHY: ICD-10-CM

## 2024-12-30 RX ORDER — FENTANYL CITRATE 50 UG/ML
INJECTION, SOLUTION INTRAMUSCULAR; INTRAVENOUS DAILY PRN
Status: DISCONTINUED | OUTPATIENT
Start: 2024-12-30 | End: 2024-12-30 | Stop reason: HOSPADM

## 2024-12-30 RX ORDER — BETAMETHASONE SODIUM PHOSPHATE AND BETAMETHASONE ACETATE 3; 3 MG/ML; MG/ML
INJECTION, SUSPENSION INTRA-ARTICULAR; INTRALESIONAL; INTRAMUSCULAR; SOFT TISSUE DAILY PRN
Status: DISCONTINUED | OUTPATIENT
Start: 2024-12-30 | End: 2024-12-30 | Stop reason: HOSPADM

## 2024-12-30 RX ORDER — BUPIVACAINE HYDROCHLORIDE 5 MG/ML
INJECTION, SOLUTION EPIDURAL; INTRACAUDAL DAILY PRN
Status: DISCONTINUED | OUTPATIENT
Start: 2024-12-30 | End: 2024-12-30 | Stop reason: HOSPADM

## 2024-12-30 RX ORDER — LIDOCAINE HYDROCHLORIDE 10 MG/ML
INJECTION, SOLUTION EPIDURAL; INFILTRATION; INTRACAUDAL; PERINEURAL DAILY PRN
Status: DISCONTINUED | OUTPATIENT
Start: 2024-12-30 | End: 2024-12-30 | Stop reason: HOSPADM

## 2024-12-30 NOTE — DISCHARGE INSTRUCTIONS
Home Care Instructions after a Radio Frequency Ablation      Activity  -Rest today  -Do not work today  -Resume normal activity in 2-3 days  -No heavy lifting, turning or twisting for 24 hours  -DO NOT shower or soak in tub for 24 hours  -DO NOT remove bandaid for 24 hours    Pain  -You may experience soreness at the injection site for one or two days  -You may use an ice pack for 20 minutes every 2 hours for the first 24 hours, longer if needed for comfort, do not use heat  -You may use Tylenol (acetaminophen) every 4 hours or other pain medicines as directed by your physician  -If other pain medications are prescribed by your physician, please follow dosing instructions carefully.    What to expect  You may experience numbness radiating into your legs or arms (depending on the procedure location). This numbness may last several hours. Until sensation returns to normal, please use caution in walking, climbing stairs, and stepping out of your vehicle, etc. Relief of your initial symptoms can take up to 4 weeks to feel better, this is normal and due to the healing process. The procedure site may feel like a deep burn. Using ice will greatly minimize this discomfort. Do not use numbing creams or patches over your injection sites immediately following your procedure. Please keep injection sites covered, clean and dry for 24 hours.    DID YOU RECEIVE SEDATION TODAY?  Yes    Safety  Sedation medicine, if given, may remain active for many hours. It is important for the next 24 hours that you do not:  -Drive a car  -Operate machines or power tools  -Consume alcohol, including beer  -Sign any important papers or legal documents  -Please have a responsible adult with you for 24 hours following any sedation      Common side effects of steroids:  Not everyone will experience corticosteroid side effects. If side effects are experienced, they will gradually subside in the 7-10 day period following an injection. Most common side  effects include:  -Flushed face and/or chest  -Feeling of warmth, particularly in the face but could be an overall feeling of warmth  -Increased blood sugar in diabetic patients  -Menstrual irregularities my occur. If taking hormone-based birth control an alternate method of birth control is recommended  -Sleep disturbances and/or mood swings are possible  -Leg cramps      Please contact us if you have:  -Severe pain  -Fever more than 101.5 degrees Fahrenheit  -Signs of infection at the injection site (redness, swelling, or drainage)    For Pain Center Patients:   If you have questions, please contact our office at [phone] Option #1 between the hours of 7:00 am and 3:00 pm Monday through Friday. After office hours you can contact the on call provider by dialing [phone]. If you need immediate attention, we recommend that you go to a hospital emergency room or dial [phone].     If you have procedure scheduling questions please call [phone] Option #2.     FOR PM&R PATIENTS:  For patients seen by the PM and R service, please call [phone]. (Monday through Friday 8a-5p.  After business hours and weekends call [phone] and ask for the PM and R doctor on call). If you need to fax a pain diary to PM&R the fax number is [phone]. If you are unable to fax, uploading to [employer] is encouraged, then send to provider. If you have procedure scheduling questions please call [phone] Option #2.

## 2024-12-30 NOTE — OP NOTE
Procedure(s): DSTR NROLYTC AGNT PARVERTEB FCT SNGL CRVCL/THORA; DSTR NROLYTC AGNT PARVERTEB FCT ADDL CRVCL/THORA;  AZ MOD SED SAME PHYS/QHP INITIAL 15 MINS 5/> YRS; AZ MOD SED SAME PHYS/QHP EACH ADDL 15 MINS    Pre-Procedure Diagnose(s): Cervical spondylosis without myelopathy; Cervico-occipital neuralgia of LEFT side    Post-Procedure Diagnose(s): Cervical spondylosis without myelopathy; Cervico-occipital neuralgia of LEFT side       PROCEDURE NOTE   12/30/2024     Chief Complaint: Cervicalgia, headaches, here for RFA of C4-6, on the left side    Patient is well known to the service from his previous evaluation and  treatment. He was previously seen by me for cervical injections for his ongoing  pain issues. He has had previous left,C4-C6 medial branch blocks. This gave him relief with both initial and confirmatory blocks, but the pain returned. It is therefore felt treating this He is here for undergoing  radiofrequency ablations at right C4-C6 medial branches and third occipital nerve.     The benefits and risks of the procedure were reviewed with the patient. He was an appropriatecandidate for moderate IV sedation. He was taken to the fluoroscopic suiteand placed prone on the table. Universal protocol was followed. TIME OUT conducted just prior to starting procedure confirmed patient identity, site/side, procedure, patient position, and availability of correct equipment and implants. Yes     The area was prepped with Cloralprep and draped in a sterile  fashion. His vitals including blood pressure, pulse oximeter and O2 sats  were monitored and he had oxygen given via nasal cannula. His vitals  remained stable. He received 1 mg of versed and 50 mcg of Fentanyl.  Topical anesthesia was obtained with a combination of Marcaine 0.25% and  lidocaine 1% Using Fluoroscopy, Radiofrequency probes 10 cm long, 100 mm tip were then placed at the concavity of C4,C5 and C6,  to access the Medial brances of C4-C6.  This was  confirmed with AP and Lateral views. Stimulation was then carried out at 2 hertz from 0 to 2 volts. No spread occurred beyond the local area. None went to the extremities or the face. Lesioning was then carried  out at 80 degrees C for 75 seconds.     He tolerated theprocedure well. No further medications were needed. After the lesioning a mixture comprised of 0.5 cc of Celestone and 2.5 cc of the Marcaine-lidocaine  mixture were utilized to inject the 3 different areas.     Sedation Time was from 9266-1213 registered nurse, was present for that purpose.     I have asked him to ice the regions for the next day or 2 and to anticipate  some discomfort for the next few weeks as the lesioning heals.      Thank you very much for allowing me to assist with his care. If you have any  further questions, please feel free to contact me.      Flavio Alberts MD

## 2025-01-19 ENCOUNTER — HEALTH MAINTENANCE LETTER (OUTPATIENT)
Age: 60
End: 2025-01-19

## 2025-02-26 ENCOUNTER — OFFICE VISIT (OUTPATIENT)
Dept: PHYSICAL MEDICINE AND REHAB | Facility: CLINIC | Age: 60
End: 2025-02-26
Payer: COMMERCIAL

## 2025-02-26 VITALS — SYSTOLIC BLOOD PRESSURE: 120 MMHG | OXYGEN SATURATION: 89 % | DIASTOLIC BLOOD PRESSURE: 74 MMHG | HEART RATE: 106 BPM

## 2025-02-26 DIAGNOSIS — M47.812 CERVICAL SPONDYLOSIS WITHOUT MYELOPATHY: Primary | ICD-10-CM

## 2025-02-26 DIAGNOSIS — G89.29 OTHER CHRONIC PAIN: ICD-10-CM

## 2025-02-26 DIAGNOSIS — G24.3 CERVICAL DYSTONIA: ICD-10-CM

## 2025-02-26 DIAGNOSIS — G24.8 FOCAL DYSTONIA: ICD-10-CM

## 2025-02-26 PROCEDURE — 3078F DIAST BP <80 MM HG: CPT | Performed by: PHYSICAL MEDICINE & REHABILITATION

## 2025-02-26 PROCEDURE — 3074F SYST BP LT 130 MM HG: CPT | Performed by: PHYSICAL MEDICINE & REHABILITATION

## 2025-02-26 PROCEDURE — 99214 OFFICE O/P EST MOD 30 MIN: CPT | Performed by: PHYSICAL MEDICINE & REHABILITATION

## 2025-02-26 NOTE — NURSING NOTE
Heber Simon is a 60 year old male who presents for:  Chief Complaint   Patient presents with    Follow Up     More good than bad days, some more improvement       Initial Vitals: /74 (BP Location: Right arm, Patient Position: Sitting, Cuff Size: Adult Regular)   Pulse 106   SpO2 (!) 89%   BP cuff size: regular      Leda Pelayo MA

## 2025-02-26 NOTE — PROGRESS NOTES
CC: Patient with chronic left neck shoulder and chest pain.  Here for follow-up following recent Botox injections on 11 3125.     Patient returns for a follow-up visit for his ongoing issues related to left shoulder and chest wall pain.  He was  seen by me on 12/30/2024 for undergoing radiofrequency ablations from left C4-C6.  He feels it has been very helpful in reducing the pain by more than 50%.  He is also able to turn his head to the left much better.     He feels overall he is much improved.  The shoulder is better.  He does drinks in moderation and avoids anything in excess.  He has to be careful with how he uses his neck and shoulders.  He has not had any numbness or tingling.     He was seen by me on 10/22/2024.  Previous trigger point injections have helped him for about 8 days and then it got worse.     He was seen by me on 10/9/2024.  He received trigger point injections to multiple muscles in the neck and shoulder and chest wall on the left side.  He felt it was helpful for about 8 days and then got worse.     He was seen by me on 9/25/2024.  His interval history is notable for getting 2 days worth of good relief with the pain.  However he ended up getting massage therapy and his pain shot up to 8-9 out of 10.     He also has a traction unit that he has used in the past.  I had cautioned him from using it due to the significant narrowing and impingement that he has in the cervical spine.     Patient is a very pleasant 60-year-old disabled  with a history of COPD.  He has been dealing with chronic neck pain for many years but has been worse since March of this year.  He rates his pain at its worst at 10 at best a 7 and currently and 9.  Pain would radiate into the chest from the back and notes that it feels searing.  It has also affected his left hand functioning with clumsiness.  He has tried naproxen but had problems with GI and had to stop.  He has completed physical therapy and chiropractic  without help.  He has no history of prior surgeries.  He had initially quit smoking but has resumed due to the pain.  He was recently seen by the neurosurgeon.  He had undergone MRI of the cervical spine on 8/2/2024.  This was at [facility].  This did show moderate C3-4 spinal stenosis with retrolisthesis and 3 to 4 mm paracentral disc protrusion impinging the cord.  Mild C5-6 and C6-7 spinal stenosis and severe bilateral C6-7 and left more so than right C5-6 neuroforaminal stenosis.     No neurosurgical intervention was felt indicated.  Dr. Mancuso did not see significant central canal or neuroforaminal stenosis.     PMH:  1. Acute hypoxic respiratory failure secondary to acute COPD exacerbation  2. Anxiety disorder  3. Hyperlipidemia  4. Chronic constipation  5. Hyponatremia   6. schizoaffective disorder  7. Bipolar type 1      Past Surgical History         Past Surgical History:   Procedure Laterality Date    DESTRUCTION OF PARAVERTEBRAL FACET CERVICAL / THORACIC ADDITIONAL Left 12/30/2024     Procedure: DESTRUCTION OF PARAVERTEBRAL FACET CERVICAL / THORACIC ADDITIONAL;  Surgeon: Flavio Alberts MD;  Location: [facility] OR    DESTRUCTION OF PARAVERTEBRAL FACETCERVICAL / THORACIC SINGLE Left 12/30/2024     Procedure: DESTRUCTION, NERVE, FACET JOINT, CERVICOTHORACIC, 1 NERVE C4-C6;  Surgeon: Flavio Alberts MD;  Location: [facility] OR    INJECT BLOCK MEDIAL BRANCH CERVICAL/THORACIC/LUMBAR Left 12/2/2024     Procedure: BLOCK, NERVE, FACET JOINT, MEDIAL BRANCH, DIAGNOSTIC, C4-C6 left;  Surgeon: Flavio Alberts MD;  Location: [facility] OR    INJECT BLOCK MEDIAL BRANCH CERVICAL/THORACIC/LUMBAR Left 12/16/2024     Procedure: BLOCK, NERVE, FACET JOINT, MEDIAL BRANCH, DIAGNOSTIC, C4-C6 left;  Surgeon: Flavio Alberts MD;  Location: [facility] OR               There is family history of back pain in his brother.     Social History   Social History                Socioeconomic History    Marital status: Single       Spouse  name: Not on file    Number of children: Not on file    Years of education: Not on file    Highest education level: Not on file   Occupational History    Not on file   Tobacco Use    Smoking status: Every Day       Types: Cigarettes    Smokeless tobacco: Never   Vaping Use    Vaping status: Never Used   Substance and Sexual Activity    Alcohol use: Not on file    Drug use: Not on file    Sexual activity: Not on file   Other Topics Concern    Not on file   Social History Narrative    Not on file      Social Determinants of Health      Financial Resource Strain: Not on file   Food Insecurity: Not on file   Transportation Needs: Not on file   Physical Activity: Not on file   Stress: Not on file   Social Connections: Not on file   Interpersonal Safety: Not on file   Housing Stability: Not on file         Current Outpatient Medications   Medication Sig Dispense Refill    acetaminophen (TYLENOL) 500 MG tablet Take 1,000 mg by mouth.      albuterol (PROAIR HFA/PROVENTIL HFA/VENTOLIN HFA) 108 (90 Base) MCG/ACT inhaler Inhale 2 puffs into the lungs.      albuterol (PROAIR HFA/PROVENTIL HFA/VENTOLIN HFA) 108 (90 Base) MCG/ACT inhaler INHALE 2 PUFFS BY MOUTH EVERY 4 HOURS AS NEEDED FOR BREATHING , WAIT AT LEAST 1-5 MINUTES BETWEEN EACH PUFF      benztropine (COGENTIN) 0.5 MG tablet Take 0.5 mg by mouth.      budesonide-formoterol (SYMBICORT) 160-4.5 MCG/ACT Inhaler Inhale 2 Inhalations into the lungs.      clonazePAM (KLONOPIN) 0.5 MG tablet Take 0.5 mg by mouth.      cloZAPine (CLOZARIL) 100 MG tablet Take 100 mg by mouth every morning.      cloZAPine (CLOZARIL) 100 MG tablet Take 200 mg by mouth.      divalproex sodium extended-release (DEPAKOTE ER) 250 MG 24 hr tablet 250 mg.      divalproex sodium extended-release (DEPAKOTE ER) 500 MG 24 hr tablet Take 1,500 mg by mouth.      doxycycline hyclate (VIBRA-TABS) 100 MG tablet Take 100 mg by mouth.      famotidine (PEPCID) 20 MG tablet 20 mg.      fluticasone-salmeterol  (ADVAIR) 500-50 MCG/ACT inhaler Inhale into the lungs.      guaiFENesin (MUCINEX) 600 MG 12 hr tablet Take 600 mg by mouth.      naproxen (NAPROSYN) 500 MG tablet Take 500 mg by mouth.      predniSONE (DELTASONE) 20 MG tablet Take 40 mg by mouth.      tamsulosin (FLOMAX) 0.4 MG capsule Take 0.4 mg by mouth.      tiotropium (SPIRIVA RESPIMAT) 2.5 MCG/ACT inhaler Inhale into the lungs.      varenicline (CHANTIX) 0.5 MG tablet Take 0.5 mg by mouth.      venlafaxine (EFFEXOR XR) 75 MG 24 hr capsule 225 mg.               There were no vitals taken for this visit.      On examination, patient is alert and cooperative.  He has COPD.     Tremor noted uppers greater than lowers.     HEENT is otherwise unremarkable.  Extraocular movements are intact.  Face is symmetric.  Tongue is midline.  Neck is supple.  He is able to move his upper extremities.  He has discomfort with the left shoulder region.  The cervical facets are nontender.  There is no tenderness over the cervical paraspinal muscles either.  There is t improvement in the tightness of the left trapezius, levator scapulae and the neck.  There is improvement in the areas of spasms and tenderness over  the rhomboids minor rhomboids major infraspinatus, and pectoralis minor/coracobrachialis in the chest.     He has protuberant abdomen.  No tenderness noted over the lumbar spine.  SI was nontender.     He is able to stand.  Romberg is negative.  Gait is unremarkable.  Neurological examination revealed normal strength, normal sensation.  Reflexes were symmetric and plantars were downgoing.  Coordination tests are intact.     Impression: At this point this 60-year-old gentleman with cervical facet arthropathy at C4-5 and C5-6 resulting in left-sided neck and shoulder pain and chest wall pain .  He has received treatment with Botox for a number of muscles affected in the neck and shoulder region and chest wall region in a Cervical dystonia and focal dystonia pattern.  He has  received and benefited from trigger point injections.  I recommended to be careful with massages and tractions and avoid them when possible.  I also encouraged him to use some ice on a as needed basis.     I encouraged him to take 1000 mg of Tylenol every 8 hours.  He is on Pepcid and has already tried naproxen without relief.     We will continue with 175-200 units of Botox to treat the affected muscles as that will give him longer periods of relief as he has, 12 weeks from the last Botox treatment.       30 minutes visit, exclusive of the procedure time, greater than 50% was for counseling and coordination of above-mentioned issues, all on the date of encounter..     All questions were answered to his and his sister-in-law's satisfaction.     Flavio Alberts MD

## 2025-02-26 NOTE — LETTER
2/26/2025       RE: Heber Simon  [address]  [place] [address]     Dear Colleague,    Thank you for referring your patient, Heber Simon, to the [facility] at [facility]. Please see a copy of my visit note below.    CC: Patient with chronic left neck shoulder and chest pain.  Here for follow-up following recent Botox injections on 11 [id number].     Patient returns for a follow-up visit for his ongoing issues related to left shoulder and chest wall pain.  He was  seen by me on 12/30/2024 for undergoing radiofrequency ablations from left C4-C6.  He feels it has been very helpful in reducing the pain by more than 50%.  He is also able to turn his head to the left much better.     He feels overall he is much improved.  The shoulder is better.  He does drinks in moderation and avoids anything in excess.  He has to be careful with how he uses his neck and shoulders.  He has not had any numbness or tingling.     He was seen by me on 10/22/2024.  Previous trigger point injections have helped him for about 8 days and then it got worse.     He was seen by me on 10/9/2024.  He received trigger point injections to multiple muscles in the neck and shoulder and chest wall on the left side.  He felt it was helpful for about 8 days and then got worse.     He was seen by me on 9/25/2024.  His interval history is notable for getting 2 days worth of good relief with the pain.  However he ended up getting massage therapy and his pain shot up to 8-9 out of 10.     He also has a traction unit that he has used in the past.  I had cautioned him from using it due to the significant narrowing and impingement that he has in the cervical spine.     Patient is a very pleasant 60-year-old disabled  with a history of COPD.  He has been dealing with chronic neck pain for many years but has been worse since March of this year.  He rates his pain  at its worst at 10 at best a 7 and currently and 9.  Pain would radiate into the chest from the back and notes that it feels searing.  It has also affected his left hand functioning with clumsiness.  He has tried naproxen but had problems with GI and had to stop.  He has completed physical therapy and chiropractic without help.  He has no history of prior surgeries.  He had initially quit smoking but has resumed due to the pain.  He was recently seen by the neurosurgeon.  He had undergone MRI of the cervical spine on 8/2/2024.  This was at [facility].  This did show moderate C3-4 spinal stenosis with retrolisthesis and 3 to 4 mm paracentral disc protrusion impinging the cord.  Mild C5-6 and C6-7 spinal stenosis and severe bilateral C6-7 and left more so than right C5-6 neuroforaminal stenosis.     No neurosurgical intervention was felt indicated.  Dr. Mancuso did not see significant central canal or neuroforaminal stenosis.     PMH:  1. Acute hypoxic respiratory failure secondary to acute COPD exacerbation  2. Anxiety disorder  3. Hyperlipidemia  4. Chronic constipation  5. Hyponatremia   6. schizoaffective disorder  7. Bipolar type 1      Past Surgical History         Past Surgical History:   Procedure Laterality Date     DESTRUCTION OF PARAVERTEBRAL FACET CERVICAL / THORACIC ADDITIONAL Left 12/30/2024     Procedure: DESTRUCTION OF PARAVERTEBRAL FACET CERVICAL / THORACIC ADDITIONAL;  Surgeon: Flavio Alberts MD;  Location: [facility] OR     DESTRUCTION OF PARAVERTEBRAL FACETCERVICAL / THORACIC SINGLE Left 12/30/2024     Procedure: DESTRUCTION, NERVE, FACET JOINT, CERVICOTHORACIC, 1 NERVE C4-C6;  Surgeon: Flavio Alberts MD;  Location: [facility] OR     INJECT BLOCK MEDIAL BRANCH CERVICAL/THORACIC/LUMBAR Left 12/2/2024     Procedure: BLOCK, NERVE, FACET JOINT, MEDIAL BRANCH, DIAGNOSTIC, C4-C6 left;  Surgeon: Flavio Alberts MD;  Location: [facility] OR     INJECT BLOCK MEDIAL BRANCH CERVICAL/THORACIC/LUMBAR Left  12/16/2024     Procedure: BLOCK, NERVE, FACET JOINT, MEDIAL BRANCH, DIAGNOSTIC, C4-C6 left;  Surgeon: Flavio Alberts MD;  Location: [facility] OR               There is family history of back pain in his brother.     Social History   Social History                Socioeconomic History     Marital status: Single       Spouse name: Not on file     Number of children: Not on file     Years of education: Not on file     Highest education level: Not on file   Occupational History     Not on file   Tobacco Use     Smoking status: Every Day       Types: Cigarettes     Smokeless tobacco: Never   Vaping Use     Vaping status: Never Used   Substance and Sexual Activity     Alcohol use: Not on file     Drug use: Not on file     Sexual activity: Not on file   Other Topics Concern     Not on file   Social History Narrative     Not on file      Social Determinants of Health      Financial Resource Strain: Not on file   Food Insecurity: Not on file   Transportation Needs: Not on file   Physical Activity: Not on file   Stress: Not on file   Social Connections: Not on file   Interpersonal Safety: Not on file   Housing Stability: Not on file         Current Outpatient Medications   Medication Sig Dispense Refill     acetaminophen (TYLENOL) 500 MG tablet Take 1,000 mg by mouth.       albuterol (PROAIR HFA/PROVENTIL HFA/VENTOLIN HFA) 108 (90 Base) MCG/ACT inhaler Inhale 2 puffs into the lungs.       albuterol (PROAIR HFA/PROVENTIL HFA/VENTOLIN HFA) 108 (90 Base) MCG/ACT inhaler INHALE 2 PUFFS BY MOUTH EVERY 4 HOURS AS NEEDED FOR BREATHING , WAIT AT LEAST 1-5 MINUTES BETWEEN EACH PUFF       benztropine (COGENTIN) 0.5 MG tablet Take 0.5 mg by mouth.       budesonide-formoterol (SYMBICORT) 160-4.5 MCG/ACT Inhaler Inhale 2 Inhalations into the lungs.       clonazePAM (KLONOPIN) 0.5 MG tablet Take 0.5 mg by mouth.       cloZAPine (CLOZARIL) 100 MG tablet Take 100 mg by mouth every morning.       cloZAPine (CLOZARIL) 100 MG tablet Take 200  mg by mouth.       divalproex sodium extended-release (DEPAKOTE ER) 250 MG 24 hr tablet 250 mg.       divalproex sodium extended-release (DEPAKOTE ER) 500 MG 24 hr tablet Take 1,500 mg by mouth.       doxycycline hyclate (VIBRA-TABS) 100 MG tablet Take 100 mg by mouth.       famotidine (PEPCID) 20 MG tablet 20 mg.       fluticasone-salmeterol (ADVAIR) 500-50 MCG/ACT inhaler Inhale into the lungs.       guaiFENesin (MUCINEX) 600 MG 12 hr tablet Take 600 mg by mouth.       naproxen (NAPROSYN) 500 MG tablet Take 500 mg by mouth.       predniSONE (DELTASONE) 20 MG tablet Take 40 mg by mouth.       tamsulosin (FLOMAX) 0.4 MG capsule Take 0.4 mg by mouth.       tiotropium (SPIRIVA RESPIMAT) 2.5 MCG/ACT inhaler Inhale into the lungs.       varenicline (CHANTIX) 0.5 MG tablet Take 0.5 mg by mouth.       venlafaxine (EFFEXOR XR) 75 MG 24 hr capsule 225 mg.               There were no vitals taken for this visit.      On examination, patient is alert and cooperative.  He has COPD.     Tremor noted uppers greater than lowers.     HEENT is otherwise unremarkable.  Extraocular movements are intact.  Face is symmetric.  Tongue is midline.  Neck is supple.  He is able to move his upper extremities.  He has discomfort with the left shoulder region.  The cervical facets are nontender.  There is no tenderness over the cervical paraspinal muscles either.  There is t improvement in the tightness of the left trapezius, levator scapulae and the neck.  There is improvement in the areas of spasms and tenderness over  the rhomboids minor rhomboids major infraspinatus, and pectoralis minor/coracobrachialis in the chest.     He has protuberant abdomen.  No tenderness noted over the lumbar spine.  SI was nontender.     He is able to stand.  Romberg is negative.  Gait is unremarkable.  Neurological examination revealed normal strength, normal sensation.  Reflexes were symmetric and plantars were downgoing.  Coordination tests are intact.      Impression: At this point this 60-year-old gentleman with cervical facet arthropathy at C4-5 and C5-6 resulting in left-sided neck and shoulder pain and chest wall pain .  He has received treatment with Botox for a number of muscles affected in the neck and shoulder region and chest wall region in a Cervical dystonia and focal dystonia pattern.  He has received and benefited from trigger point injections.  I recommended to be careful with massages and tractions and avoid them when possible.  I also encouraged him to use some ice on a as needed basis.     I encouraged him to take 1000 mg of Tylenol every 8 hours.  He is on Pepcid and has already tried naproxen without relief.     We will continue with 175-200 units of Botox to treat the affected muscles as that will give him longer periods of relief as he has, 12 weeks from the last Botox treatment.       30 minutes visit, exclusive of the procedure time, greater than 50% was for counseling and coordination of above-mentioned issues, all on the date of encounter..     All questions were answered to his and his sister-in-law's satisfaction.     Flavio Alberts MD       Again, thank you for allowing me to participate in the care of your patient.      Sincerely,    Flavio Alberts MD

## 2025-05-12 ENCOUNTER — MYC MEDICAL ADVICE (OUTPATIENT)
Dept: NEUROSURGERY | Facility: CLINIC | Age: 60
End: 2025-05-12

## (undated) DEVICE — PREP CHLORAPREP W/ORANGE TINT 10.5ML 260715

## (undated) DEVICE — DRSG PRIMAPORE 03 1/8X6" 66000318

## (undated) DEVICE — GLOVE BIOGEL PI MICRO SZ 7.0 48570

## (undated) DEVICE — GLOVE BIOGEL PI ULTRATOUCH SZ 7.0 41170

## (undated) DEVICE — TRAY PAIN INJECTION 97A 640

## (undated) DEVICE — NDL SPINAL 25GA 3.5" QUINCKE 405180

## (undated) RX ORDER — FENTANYL CITRATE 50 UG/ML
INJECTION, SOLUTION INTRAMUSCULAR; INTRAVENOUS
Status: DISPENSED
Start: 2024-12-30